# Patient Record
Sex: FEMALE | Race: WHITE | Employment: FULL TIME | URBAN - METROPOLITAN AREA
[De-identification: names, ages, dates, MRNs, and addresses within clinical notes are randomized per-mention and may not be internally consistent; named-entity substitution may affect disease eponyms.]

---

## 2024-08-27 ENCOUNTER — HOSPITAL ENCOUNTER (EMERGENCY)
Age: 34
Discharge: HOME | End: 2024-08-27

## 2024-08-27 ENCOUNTER — APPOINTMENT (OUTPATIENT)
Dept: ULTRASOUND IMAGING | Age: 34
End: 2024-08-27

## 2024-08-27 VITALS
OXYGEN SATURATION: 100 % | TEMPERATURE: 98.2 F | RESPIRATION RATE: 17 BRPM | WEIGHT: 215 LBS | DIASTOLIC BLOOD PRESSURE: 66 MMHG | SYSTOLIC BLOOD PRESSURE: 113 MMHG | HEART RATE: 77 BPM

## 2024-08-27 DIAGNOSIS — O26.891 ABDOMINAL PAIN DURING PREGNANCY, FIRST TRIMESTER: Primary | ICD-10-CM

## 2024-08-27 DIAGNOSIS — Z3A.12 12 WEEKS GESTATION OF PREGNANCY: ICD-10-CM

## 2024-08-27 DIAGNOSIS — R11.2 NAUSEA AND VOMITING, UNSPECIFIED VOMITING TYPE: ICD-10-CM

## 2024-08-27 DIAGNOSIS — K59.00 CONSTIPATION, UNSPECIFIED CONSTIPATION TYPE: ICD-10-CM

## 2024-08-27 DIAGNOSIS — R10.9 ABDOMINAL PAIN DURING PREGNANCY, FIRST TRIMESTER: Primary | ICD-10-CM

## 2024-08-27 DIAGNOSIS — E86.0 DEHYDRATION: ICD-10-CM

## 2024-08-27 DIAGNOSIS — R10.9 ABDOMINAL PAIN, UNSPECIFIED ABDOMINAL LOCATION: ICD-10-CM

## 2024-08-27 LAB
ALBUMIN SERPL-MCNC: 3.9 GM/DL (ref 3.4–5)
ALP BLD-CCNC: 73 IU/L (ref 40–128)
ALT SERPL-CCNC: 13 U/L (ref 10–40)
ANION GAP SERPL CALCULATED.3IONS-SCNC: 12 MMOL/L (ref 7–16)
AST SERPL-CCNC: 25 IU/L (ref 15–37)
BACTERIA: ABNORMAL /HPF
BASOPHILS ABSOLUTE: 0 K/CU MM
BASOPHILS RELATIVE PERCENT: 0.2 % (ref 0–1)
BILIRUB SERPL-MCNC: 0.2 MG/DL (ref 0–1)
BILIRUBIN, URINE: NEGATIVE MG/DL
BLOOD, URINE: NEGATIVE
BUN SERPL-MCNC: 10 MG/DL (ref 6–23)
CALCIUM SERPL-MCNC: 9.1 MG/DL (ref 8.3–10.6)
CHLORIDE BLD-SCNC: 101 MMOL/L (ref 99–110)
CLARITY, UA: ABNORMAL
CO2: 21 MMOL/L (ref 21–32)
COLOR, UA: YELLOW
CREAT SERPL-MCNC: 0.4 MG/DL (ref 0.6–1.1)
DIFFERENTIAL TYPE: ABNORMAL
EOSINOPHILS ABSOLUTE: 0.2 K/CU MM
EOSINOPHILS RELATIVE PERCENT: 2.1 % (ref 0–3)
GFR, ESTIMATED: >90 ML/MIN/1.73M2
GLUCOSE SERPL-MCNC: 83 MG/DL (ref 70–99)
GLUCOSE URINE: NEGATIVE MG/DL
HCT VFR BLD CALC: 39.5 % (ref 37–47)
HEMOGLOBIN: 12.9 GM/DL (ref 12.5–16)
IMMATURE NEUTROPHIL %: 0.2 % (ref 0–0.43)
KETONES, URINE: ABNORMAL MG/DL
LEUKOCYTE ESTERASE, URINE: ABNORMAL
LIPASE: 18 IU/L (ref 13–60)
LYMPHOCYTES ABSOLUTE: 2.4 K/CU MM
LYMPHOCYTES RELATIVE PERCENT: 25.9 % (ref 24–44)
MAGNESIUM: 2 MG/DL (ref 1.8–2.4)
MCH RBC QN AUTO: 29.3 PG (ref 27–31)
MCHC RBC AUTO-ENTMCNC: 32.7 % (ref 32–36)
MCV RBC AUTO: 89.6 FL (ref 78–100)
MONOCYTES ABSOLUTE: 0.5 K/CU MM
MONOCYTES RELATIVE PERCENT: 5.1 % (ref 0–4)
MUCUS: ABNORMAL HPF
NEUTROPHILS ABSOLUTE: 6.1 K/CU MM
NEUTROPHILS RELATIVE PERCENT: 66.5 % (ref 36–66)
NITRITE URINE, QUANTITATIVE: NEGATIVE
NUCLEATED RBC %: 0 %
PDW BLD-RTO: 13.2 % (ref 11.7–14.9)
PH, URINE: 6 (ref 5–8)
PLATELET # BLD: 179 K/CU MM (ref 140–440)
PMV BLD AUTO: 12.5 FL (ref 7.5–11.1)
POTASSIUM SERPL-SCNC: 5 MMOL/L (ref 3.5–5.1)
PROTEIN UA: NEGATIVE MG/DL
RBC # BLD: 4.41 M/CU MM (ref 4.2–5.4)
RBC URINE: 0 /HPF (ref 0–6)
SODIUM BLD-SCNC: 134 MMOL/L (ref 135–145)
SPECIFIC GRAVITY UA: 1.02 (ref 1–1.03)
SQUAMOUS EPITHELIAL: 26 /HPF
TOTAL IMMATURE NEUTOROPHIL: 0.02 K/CU MM
TOTAL NUCLEATED RBC: 0 K/CU MM
TOTAL PROTEIN: 6.8 GM/DL (ref 6.4–8.2)
TRICHOMONAS: ABNORMAL /HPF
UROBILINOGEN, URINE: 2 MG/DL (ref 0.2–1)
WBC # BLD: 9.2 K/CU MM (ref 4–10.5)
WBC UA: 5 /HPF (ref 0–5)

## 2024-08-27 PROCEDURE — 96372 THER/PROPH/DIAG INJ SC/IM: CPT

## 2024-08-27 PROCEDURE — 99284 EMERGENCY DEPT VISIT MOD MDM: CPT

## 2024-08-27 PROCEDURE — 81001 URINALYSIS AUTO W/SCOPE: CPT

## 2024-08-27 PROCEDURE — 80053 COMPREHEN METABOLIC PANEL: CPT

## 2024-08-27 PROCEDURE — 83735 ASSAY OF MAGNESIUM: CPT

## 2024-08-27 PROCEDURE — 93975 VASCULAR STUDY: CPT

## 2024-08-27 PROCEDURE — 83690 ASSAY OF LIPASE: CPT

## 2024-08-27 PROCEDURE — 76801 OB US < 14 WKS SINGLE FETUS: CPT

## 2024-08-27 PROCEDURE — 96374 THER/PROPH/DIAG INJ IV PUSH: CPT

## 2024-08-27 PROCEDURE — 6360000002 HC RX W HCPCS: Performed by: NURSE PRACTITIONER

## 2024-08-27 PROCEDURE — 2580000003 HC RX 258: Performed by: NURSE PRACTITIONER

## 2024-08-27 PROCEDURE — 96361 HYDRATE IV INFUSION ADD-ON: CPT

## 2024-08-27 PROCEDURE — 85025 COMPLETE CBC W/AUTO DIFF WBC: CPT

## 2024-08-27 RX ORDER — DOXYLAMINE SUCCINATE AND PYRIDOXINE HYDROCHLORIDE, DELAYED RELEASE TABLETS 10 MG/10 MG 10; 10 MG/1; MG/1
10 TABLET, DELAYED RELEASE ORAL 2 TIMES DAILY
Qty: 60 TABLET | Refills: 0 | Status: SHIPPED | OUTPATIENT
Start: 2024-08-27 | End: 2024-09-26

## 2024-08-27 RX ORDER — PROMETHAZINE HYDROCHLORIDE 25 MG/1
25 SUPPOSITORY RECTAL EVERY 6 HOURS PRN
Qty: 20 SUPPOSITORY | Refills: 0 | Status: SHIPPED | OUTPATIENT
Start: 2024-08-27 | End: 2024-09-03

## 2024-08-27 RX ORDER — ONDANSETRON 2 MG/ML
4 INJECTION INTRAMUSCULAR; INTRAVENOUS ONCE
Status: COMPLETED | OUTPATIENT
Start: 2024-08-27 | End: 2024-08-27

## 2024-08-27 RX ORDER — 0.9 % SODIUM CHLORIDE 0.9 %
2000 INTRAVENOUS SOLUTION INTRAVENOUS ONCE
Status: COMPLETED | OUTPATIENT
Start: 2024-08-27 | End: 2024-08-27

## 2024-08-27 RX ORDER — PROMETHAZINE HYDROCHLORIDE 25 MG/ML
25 INJECTION, SOLUTION INTRAMUSCULAR; INTRAVENOUS ONCE
Status: COMPLETED | OUTPATIENT
Start: 2024-08-27 | End: 2024-08-27

## 2024-08-27 RX ADMIN — ONDANSETRON 4 MG: 2 INJECTION INTRAMUSCULAR; INTRAVENOUS at 18:04

## 2024-08-27 RX ADMIN — PROMETHAZINE HYDROCHLORIDE 25 MG: 25 INJECTION INTRAMUSCULAR; INTRAVENOUS at 19:11

## 2024-08-27 RX ADMIN — SODIUM CHLORIDE 2000 ML: 9 INJECTION, SOLUTION INTRAVENOUS at 18:05

## 2024-08-27 ASSESSMENT — PAIN - FUNCTIONAL ASSESSMENT
PAIN_FUNCTIONAL_ASSESSMENT: 0-10
PAIN_FUNCTIONAL_ASSESSMENT: 0-10

## 2024-08-27 ASSESSMENT — PAIN SCALES - GENERAL
PAINLEVEL_OUTOF10: 6
PAINLEVEL_OUTOF10: 0

## 2024-08-27 NOTE — ED NOTES
Patient presents to Ed with complaints of constipation and emesis. Patient reports that she is approx 12 weeks pregnant.. patient denies any vaginal bleeding discharge or abdominal pain.

## 2024-08-27 NOTE — ED PROVIDER NOTES
[facility] EMERGENCY DEPARTMENT  EMERGENCY DEPARTMENT ENCOUNTER        Pt Name: Marissa Flores  MRN: [id number]  Birthdate 1990  Date of evaluation: 8/27/2024  Provider: DIANE CULVER - CNP  PCP: No primary care provider on file.    RAÚL. I have evaluated this patient.        Triage CHIEF COMPLAINT       Chief Complaint   Patient presents with    Constipation    Emesis         HISTORY OF PRESENT ILLNESS      Chief Complaint: N/V, abdominal pain, constipation    Marissa Flores is a 34 y.o. female who presents for evaluation of N/V in pregnancy as well as abdominal cramping and constipation.  No BM for 1 week.  The cramping is all in the lower abdomen, she feels full like she needs to have a BM.  She has not taken in much fluid or food over the past week due to vomiting from pregnancy, has not been able to keep down fluids for the last 3 days.  Decreased urine output last few days.  Has been taking ondansetron, last dose yesterday, taking miralax the last 2 days and one dose of colace today.  She is having some mild dysuria.  She is 12weeks pregnant, has had a confirmed IUP by US at [facility].        Nursing Notes were all reviewed and agreed with or any disagreements were addressed in the HPI.    REVIEW OF SYSTEMS     Pertinent ROS as noted in HPI.      PAST MEDICAL HISTORY   No past medical history on file.    SURGICAL HISTORY     Past Surgical History:   Procedure Laterality Date    STOMACH SURGERY         CURRENTMEDICATIONS       Discharge Medication List as of 8/27/2024  9:19 PM          ALLERGIES     Patient has no known allergies.    FAMILYHISTORY     No family history on file.     SOCIAL HISTORY       Social History     Socioeconomic History    Marital status:    Tobacco Use    Smoking status: Never    Smokeless tobacco: Never   Substance and Sexual Activity    Drug use: Not Currently     Social Determinants of Health     Financial Resource

## 2024-09-03 ENCOUNTER — HOSPITAL ENCOUNTER (EMERGENCY)
Age: 34
Discharge: HOME | End: 2024-09-03
Attending: STUDENT IN AN ORGANIZED HEALTH CARE EDUCATION/TRAINING PROGRAM

## 2024-09-03 VITALS
WEIGHT: 212 LBS | BODY MASS INDEX: 35.32 KG/M2 | RESPIRATION RATE: 15 BRPM | SYSTOLIC BLOOD PRESSURE: 120 MMHG | HEIGHT: 65 IN | HEART RATE: 70 BPM | OXYGEN SATURATION: 100 % | TEMPERATURE: 97.8 F | DIASTOLIC BLOOD PRESSURE: 74 MMHG

## 2024-09-03 DIAGNOSIS — K59.00 CONSTIPATION, UNSPECIFIED CONSTIPATION TYPE: Primary | ICD-10-CM

## 2024-09-03 LAB
ALBUMIN SERPL-MCNC: 3.7 GM/DL (ref 3.4–5)
ALP BLD-CCNC: 70 IU/L (ref 40–129)
ALT SERPL-CCNC: 13 U/L (ref 10–40)
ANION GAP SERPL CALCULATED.3IONS-SCNC: 10 MMOL/L (ref 7–16)
AST SERPL-CCNC: 14 IU/L (ref 15–37)
BASOPHILS ABSOLUTE: 0 K/CU MM
BASOPHILS RELATIVE PERCENT: 0.4 % (ref 0–1)
BILIRUB SERPL-MCNC: 0.2 MG/DL (ref 0–1)
BUN SERPL-MCNC: 6 MG/DL (ref 6–23)
CALCIUM SERPL-MCNC: 9.1 MG/DL (ref 8.3–10.6)
CHLORIDE BLD-SCNC: 105 MMOL/L (ref 99–110)
CO2: 23 MMOL/L (ref 21–32)
CREAT SERPL-MCNC: 0.2 MG/DL (ref 0.6–1.1)
DIFFERENTIAL TYPE: ABNORMAL
EOSINOPHILS ABSOLUTE: 0.1 K/CU MM
EOSINOPHILS RELATIVE PERCENT: 1.1 % (ref 0–3)
GFR, ESTIMATED: >90 ML/MIN/1.73M2
GLUCOSE SERPL-MCNC: 83 MG/DL (ref 70–99)
HCT VFR BLD CALC: 39 % (ref 37–47)
HEMOGLOBIN: 12.9 GM/DL (ref 12.5–16)
IMMATURE NEUTROPHIL %: 0.3 % (ref 0–0.43)
LYMPHOCYTES ABSOLUTE: 1.4 K/CU MM
LYMPHOCYTES RELATIVE PERCENT: 17.6 % (ref 24–44)
MAGNESIUM: 1.7 MG/DL (ref 1.8–2.4)
MCH RBC QN AUTO: 29.3 PG (ref 27–31)
MCHC RBC AUTO-ENTMCNC: 33.1 % (ref 32–36)
MCV RBC AUTO: 88.4 FL (ref 78–100)
MONOCYTES ABSOLUTE: 0.3 K/CU MM
MONOCYTES RELATIVE PERCENT: 4.3 % (ref 0–4)
NEUTROPHILS ABSOLUTE: 6.1 K/CU MM
NEUTROPHILS RELATIVE PERCENT: 76.3 % (ref 36–66)
NUCLEATED RBC %: 0 %
PDW BLD-RTO: 13.2 % (ref 11.7–14.9)
PLATELET # BLD: 193 K/CU MM (ref 140–440)
PMV BLD AUTO: 12.5 FL (ref 7.5–11.1)
POTASSIUM SERPL-SCNC: 4.4 MMOL/L (ref 3.5–5.1)
RBC # BLD: 4.41 M/CU MM (ref 4.2–5.4)
SODIUM BLD-SCNC: 138 MMOL/L (ref 135–145)
TOTAL IMMATURE NEUTOROPHIL: 0.02 K/CU MM
TOTAL NUCLEATED RBC: 0 K/CU MM
TOTAL PROTEIN: 6.3 GM/DL (ref 6.4–8.2)
WBC # BLD: 8 K/CU MM (ref 4–10.5)

## 2024-09-03 PROCEDURE — 80053 COMPREHEN METABOLIC PANEL: CPT

## 2024-09-03 PROCEDURE — 99284 EMERGENCY DEPT VISIT MOD MDM: CPT

## 2024-09-03 PROCEDURE — 2580000003 HC RX 258: Performed by: STUDENT IN AN ORGANIZED HEALTH CARE EDUCATION/TRAINING PROGRAM

## 2024-09-03 PROCEDURE — 83735 ASSAY OF MAGNESIUM: CPT

## 2024-09-03 PROCEDURE — 6370000000 HC RX 637 (ALT 250 FOR IP): Performed by: STUDENT IN AN ORGANIZED HEALTH CARE EDUCATION/TRAINING PROGRAM

## 2024-09-03 PROCEDURE — 85025 COMPLETE CBC W/AUTO DIFF WBC: CPT

## 2024-09-03 RX ORDER — DOCUSATE SODIUM 100 MG/1
100 CAPSULE, LIQUID FILLED ORAL ONCE
Status: COMPLETED | OUTPATIENT
Start: 2024-09-03 | End: 2024-09-03

## 2024-09-03 RX ORDER — POLYETHYLENE GLYCOL 3350 17 G/17G
17 POWDER, FOR SOLUTION ORAL DAILY PRN
Qty: 10 EACH | Refills: 0 | Status: SHIPPED | OUTPATIENT
Start: 2024-09-03 | End: 2024-09-13

## 2024-09-03 RX ORDER — POLYETHYLENE GLYCOL 3350 17 G/17G
17 POWDER, FOR SOLUTION ORAL ONCE
Status: COMPLETED | OUTPATIENT
Start: 2024-09-03 | End: 2024-09-03

## 2024-09-03 RX ORDER — 0.9 % SODIUM CHLORIDE 0.9 %
1000 INTRAVENOUS SOLUTION INTRAVENOUS ONCE
Status: COMPLETED | OUTPATIENT
Start: 2024-09-03 | End: 2024-09-03

## 2024-09-03 RX ORDER — CALCIUM POLYCARBOPHIL 625 MG
1 TABLET ORAL
Qty: 30 TABLET | Refills: 0 | Status: SHIPPED | OUTPATIENT
Start: 2024-09-03 | End: 2024-09-13

## 2024-09-03 RX ORDER — DOCUSATE SODIUM 100 MG/1
100 CAPSULE, LIQUID FILLED ORAL 2 TIMES DAILY PRN
Qty: 20 CAPSULE | Refills: 0 | Status: SHIPPED | OUTPATIENT
Start: 2024-09-03 | End: 2024-09-13

## 2024-09-03 RX ADMIN — DOCUSATE SODIUM 100 MG: 100 CAPSULE, LIQUID FILLED ORAL at 14:02

## 2024-09-03 RX ADMIN — POLYETHYLENE GLYCOL (3350) 17 G: 17 POWDER, FOR SOLUTION ORAL at 14:02

## 2024-09-03 RX ADMIN — Medication 1 PACKET: at 14:02

## 2024-09-03 RX ADMIN — SODIUM CHLORIDE 1000 ML: 9 INJECTION, SOLUTION INTRAVENOUS at 13:56

## 2024-09-03 ASSESSMENT — PAIN SCALES - GENERAL
PAINLEVEL_OUTOF10: 7
PAINLEVEL_OUTOF10: 5

## 2024-09-03 ASSESSMENT — PAIN DESCRIPTION - LOCATION
LOCATION: ABDOMEN
LOCATION: RECTUM;ABDOMEN

## 2024-09-03 ASSESSMENT — PAIN DESCRIPTION - PAIN TYPE: TYPE: ACUTE PAIN

## 2024-09-03 ASSESSMENT — PAIN DESCRIPTION - ORIENTATION: ORIENTATION: LOWER;MID

## 2024-09-03 ASSESSMENT — PAIN DESCRIPTION - DESCRIPTORS
DESCRIPTORS: CRAMPING;PRESSURE
DESCRIPTORS: ACHING

## 2024-09-03 NOTE — ED PROVIDER NOTES
Emergency Department Encounter        Pt Name: Marissa Flores  MRN: [id number]  Birthdate 1990  Date of evaluation: 9/3/2024  ED Physician: Remy Nicolas MD    CHIEF COMPLAINT     Triage Chief Complaint:   Constipation (Patient reports for 2 weeks. )      HISTORY OF PRESENT ILLNESS & REVIEW OF SYSTEMS     History obtained from the patient and staff and guest at bedside.    Marissa Flores is a 34 y.o. female who presents to the emergency department for evaluation of constipation.  Says that she is having a lot of issues with constipation lately.  Says that her last real bowel movement was about 2 weeks ago but has been able to have small bowel movements with some jeff.  Says that she is 13 weeks pregnant and has had ultrasound confirmed for her pregnancy.  Says she has a history of \"Hg \"and has been vomiting a lot.  States that she thinks he might be dehydrated.  Denies any dysuria.  Says that she has tried some stool softeners over-the-counter without resolution.        Patient denies any new Headache, Fever, Chills, Cough, Chest pain, Shortness of breath, Abdominal pain, Diarrhea, and Leg swelling.    The patient has no other acute complaints at this time.  Review of systems as above.          PAST MED/SURG/SOCIAL/FAM HISTORY & ALLERGY & MEDICATIONS     Past Medical History:   Diagnosis Date    H/O gastric sleeve     july of 2023     There is no problem list on file for this patient.    No family history on file.  No current facility-administered medications for this encounter.    Current Outpatient Medications:     docusate sodium (COLACE) 100 MG capsule, Take 1 capsule by mouth 2 times daily as needed for Constipation, Disp: 20 capsule, Rfl: 0    polyethylene glycol (MIRALAX) 17 g packet, Take 1 packet by mouth daily as needed for Constipation, Disp: 10 each, Rfl: 0    Calcium Polycarbophil (FIBER) 625 MG TABS, Take 1 tablet by mouth 3 times daily (with meals) for 10 days, Disp: 30 tablet, Rfl: 0    
home with return precautions.  She understands this plan and is agreeable with that.    ED Medication Orders (From admission, onward)      Start Ordered     Status Ordering Provider    09/03/24 1300 09/03/24 1256  sodium chloride 0.9 % bolus 1,000 mL  ONCE         Last MAR action: New Bag - by SCOOBY CASTLE on 09/03/24 at 1356 FELICIANO PEREZ    09/03/24 1300 09/03/24 1256  psyllium (HYDROCIL) 95 % packet 1 packet  ONCE         Last MAR action: Given - by SCOOBY CASTLE on 09/03/24 at 1402 FELICIANO PEREZ    09/03/24 1300 09/03/24 1256  polyethylene glycol (GLYCOLAX) packet 17 g  ONCE         Last MAR action: Given - by SCOOBY CASTLE on 09/03/24 at 1402 FELICIANO PEREZ    09/03/24 1300 09/03/24 1256  docusate sodium (COLACE) capsule 100 mg  ONCE         Last MAR action: Given - by SCOOBY CASTLE on 09/03/24 at 1402 FELICIANO PEREZ            Final Impression      1. Constipation, unspecified constipation type        DISPOSITION Decision To Discharge 09/03/2024 01:47:37 PM  Condition at Disposition: Stable     (Please note that portions of this note may have been completed with a voice recognition program. Efforts were made to edit the dictations but occasionally words are mis-transcribed.)    David Albarado DO   [employer]        David Albarado DO  09/03/24 1730

## 2024-09-03 NOTE — DISCHARGE INSTRUCTIONS
Make sure to eat and drink plenty of fluids to stay well-hydrated.  You can use MiraLAX, Dulcolax, fiber to help with your symptoms.  You can also use over-the-counter enemas or other stool softeners or laxatives.  Please call and follow up with your OBGYN  Call and follow-up with your family doctor in the next 1-3 days  Return to the ED if your symptoms worsen or you feel you need to be reevaluated    You can use the resources below to find a new family doctor if needed:                                                Primary Care Physicians    [facility]    Dr. Yoel Hernandez MD  [facility]  [address]  [place] [address]  [phone]    Melissa Li CNP  [facility]  [address]  [place], [place] [address]  [phone]    [facility]    Mavis Hernandez MD  [facility] [address]  [place], [place] [phone]      [facility].     Navneet Paz MD  [address]  [place], [place] [address]  [phone]    Ifrah Marsh CNP  [address] [place] [address]  [phone]    Stella Dixon CNP   [address] [place] [address]  [phone]    Haley Escalera MD  [address]  [place], [place] [address]  [phone]     Kristina Ibarra MD  [address]  [place], [place] [address]  [phone]    Kristen Potts PA-C  [address]  [place], [place] [address]  [phone]    Primary Care Providers [facility]    Dr. Dieter King MD  [address] [place] [address]  [phone]    Dr. Beck Guevara MD   [address] [place] [address]  [phone]    Primary Care Providers Flako Trejo MD  [address] [place], [place] [address] [phone]       [facility]    Mala Chapman MD  [address] [address]  [phone]    Arturo Galaviz YUAN  [facility]  [address] [place] [address]  [phone]       Internal Med    Stella Trivedi,

## 2024-10-14 ENCOUNTER — HOSPITAL ENCOUNTER (EMERGENCY)
Age: 34
Discharge: HOME | End: 2024-10-15

## 2024-10-14 DIAGNOSIS — R42 LIGHTHEADEDNESS: Primary | ICD-10-CM

## 2024-10-14 LAB
ALBUMIN SERPL-MCNC: 3.6 G/DL (ref 3.4–5)
ALBUMIN/GLOB SERPL: 1.6 {RATIO} (ref 1.1–2.2)
ALP SERPL-CCNC: 66 U/L (ref 40–129)
ALT SERPL-CCNC: 7 U/L (ref 10–40)
ANION GAP SERPL CALCULATED.3IONS-SCNC: 11 MMOL/L (ref 9–17)
AST SERPL-CCNC: 11 U/L (ref 15–37)
B-HCG SERPL EIA 3RD IS-ACNC: NORMAL MIU/ML
BACTERIA URNS QL MICRO: ABNORMAL
BASOPHILS # BLD: 0.04 K/UL
BASOPHILS NFR BLD: 0 % (ref 0–1)
BILIRUB SERPL-MCNC: <0.2 MG/DL (ref 0–1)
BILIRUB UR QL STRIP: NEGATIVE
BUN SERPL-MCNC: 5 MG/DL (ref 7–20)
CALCIUM SERPL-MCNC: 9 MG/DL (ref 8.3–10.6)
CHLORIDE SERPL-SCNC: 105 MMOL/L (ref 99–110)
CLARITY UR: ABNORMAL
CO2 SERPL-SCNC: 22 MMOL/L (ref 21–32)
COLOR UR: YELLOW
CREAT SERPL-MCNC: 0.5 MG/DL (ref 0.6–1.1)
EOSINOPHIL # BLD: 0.17 K/UL
EOSINOPHILS RELATIVE PERCENT: 2 % (ref 0–3)
EPI CELLS #/AREA URNS HPF: 37 /HPF
ERYTHROCYTE [DISTWIDTH] IN BLOOD BY AUTOMATED COUNT: 14.1 % (ref 11.7–14.9)
GFR, ESTIMATED: >90 ML/MIN/1.73M2
GLUCOSE SERPL-MCNC: 72 MG/DL (ref 74–99)
GLUCOSE UR STRIP-MCNC: NEGATIVE MG/DL
HCT VFR BLD AUTO: 35.4 % (ref 37–47)
HGB BLD-MCNC: 11.6 G/DL (ref 12.5–16)
HGB UR QL STRIP.AUTO: NEGATIVE
IMM GRANULOCYTES # BLD AUTO: 0.02 K/UL
IMM GRANULOCYTES NFR BLD: 0 %
KETONES UR STRIP-MCNC: ABNORMAL MG/DL
LEUKOCYTE ESTERASE UR QL STRIP: ABNORMAL
LIPASE SERPL-CCNC: 19 U/L (ref 13–60)
LYMPHOCYTES NFR BLD: 1.96 K/UL
LYMPHOCYTES RELATIVE PERCENT: 21 % (ref 24–44)
MCH RBC QN AUTO: 29.4 PG (ref 27–31)
MCHC RBC AUTO-ENTMCNC: 32.8 G/DL (ref 32–36)
MCV RBC AUTO: 89.6 FL (ref 78–100)
MONOCYTES NFR BLD: 0.44 K/UL
MONOCYTES NFR BLD: 5 % (ref 0–4)
MUCOUS THREADS URNS QL MICRO: ABNORMAL
NEUTROPHILS NFR BLD: 72 % (ref 36–66)
NEUTS SEG NFR BLD: 6.86 K/UL
NITRITE UR QL STRIP: NEGATIVE
PH UR STRIP: 7 [PH] (ref 5–8)
PLATELET # BLD AUTO: 201 K/UL (ref 140–440)
PMV BLD AUTO: 12 FL (ref 7.5–11.1)
POTASSIUM SERPL-SCNC: 3.8 MMOL/L (ref 3.5–5.1)
PROT SERPL-MCNC: 5.8 G/DL (ref 6.4–8.2)
PROT UR STRIP-MCNC: NEGATIVE MG/DL
RBC # BLD AUTO: 3.95 M/UL (ref 4.2–5.4)
RBC #/AREA URNS HPF: 8 /HPF (ref 0–2)
SODIUM SERPL-SCNC: 138 MMOL/L (ref 136–145)
SP GR UR STRIP: 1.02 (ref 1–1.03)
TRICHOMONAS #/AREA URNS HPF: ABNORMAL /[HPF]
TROPONIN I SERPL HS-MCNC: <6 NG/L (ref 0–14)
UROBILINOGEN UR STRIP-ACNC: 1 EU/DL (ref 0–1)
WBC #/AREA URNS HPF: 5 /HPF (ref 0–5)
WBC OTHER # BLD: 9.5 K/UL (ref 4–10.5)

## 2024-10-14 PROCEDURE — 84484 ASSAY OF TROPONIN QUANT: CPT

## 2024-10-14 PROCEDURE — 81001 URINALYSIS AUTO W/SCOPE: CPT

## 2024-10-14 PROCEDURE — 85025 COMPLETE CBC W/AUTO DIFF WBC: CPT

## 2024-10-14 PROCEDURE — 99284 EMERGENCY DEPT VISIT MOD MDM: CPT

## 2024-10-14 PROCEDURE — 93005 ELECTROCARDIOGRAM TRACING: CPT | Performed by: PHYSICIAN ASSISTANT

## 2024-10-14 PROCEDURE — 84702 CHORIONIC GONADOTROPIN TEST: CPT

## 2024-10-14 PROCEDURE — 80053 COMPREHEN METABOLIC PANEL: CPT

## 2024-10-14 PROCEDURE — 83690 ASSAY OF LIPASE: CPT

## 2024-10-14 RX ORDER — 0.9 % SODIUM CHLORIDE 0.9 %
1000 INTRAVENOUS SOLUTION INTRAVENOUS ONCE
Status: DISCONTINUED | OUTPATIENT
Start: 2024-10-14 | End: 2024-10-14

## 2024-10-14 ASSESSMENT — PAIN DESCRIPTION - DESCRIPTORS: DESCRIPTORS: ACHING

## 2024-10-14 ASSESSMENT — PAIN DESCRIPTION - LOCATION: LOCATION: ABDOMEN

## 2024-10-14 ASSESSMENT — PAIN SCALES - GENERAL: PAINLEVEL_OUTOF10: 5

## 2024-10-14 NOTE — ED PROVIDER NOTES
Triage Chief Complaint:   Dizziness (Pt states that walking makes her dizzy. Pt recently had zofran pump placed. 19 weeks pregnant and had hyperemesis gravidarum )    [place]:  Today in the ED I had the pleasure of caring for Marissa Flores who is a  @ 19 weeks 1 day. 34 y.o. female that presents today to the ED for evlauation.     Pt has hxo of hyperemesis with this pregnancy. Pt had a zofran pump placed yesteday. X1 days she has had abdominal bloating, headache, L sided shoulder pain radiating into the neck, and continued nausea.  Pump was placed 0830 yesterday.       Last bowel movement was 3 days ago, pt states she's been battling constipation this entire pregnancy, she has had enemas today. .   Pt is ~15 months gastric sleeve.       Dizziness:    Pt states that since 6 weeks gestation she will get lightheaded when she moves, stands ups or walks.     Pt states that she will get tunnel vission and sometimes the room will spin. None at this time.     ROS:  REVIEW OF SYSTEMS    At least 10 systems reviewed      All other review of systems are negative  See HPI and nursing notes for additional information       Past Medical History:   Diagnosis Date    H/O gastric sleeve     2023     Past Surgical History:   Procedure Laterality Date    STOMACH SURGERY       No family history on file.  Social History     Socioeconomic History    Marital status:      Spouse name: Not on file    Number of children: Not on file    Years of education: Not on file    Highest education level: Not on file   Occupational History    Not on file   Tobacco Use    Smoking status: Never    Smokeless tobacco: Never   Substance and Sexual Activity    Alcohol use: Not on file    Drug use: Not Currently    Sexual activity: Not on file   Other Topics Concern    Not on file   Social History Narrative    Not on file     Social Determinants of Health     Financial Resource Strain: Low Risk  (3/15/2020)    Received from [employer]

## 2024-10-15 VITALS
SYSTOLIC BLOOD PRESSURE: 108 MMHG | RESPIRATION RATE: 16 BRPM | TEMPERATURE: 98 F | DIASTOLIC BLOOD PRESSURE: 64 MMHG | OXYGEN SATURATION: 100 % | HEART RATE: 80 BPM

## 2024-10-15 LAB
EKG ATRIAL RATE: 74 BPM
EKG DIAGNOSIS: NORMAL
EKG P AXIS: 32 DEGREES
EKG P-R INTERVAL: 104 MS
EKG Q-T INTERVAL: 406 MS
EKG QRS DURATION: 96 MS
EKG QTC CALCULATION (BAZETT): 450 MS
EKG R AXIS: 42 DEGREES
EKG T AXIS: 18 DEGREES
EKG VENTRICULAR RATE: 74 BPM

## 2024-10-15 PROCEDURE — 93010 ELECTROCARDIOGRAM REPORT: CPT | Performed by: INTERNAL MEDICINE

## 2024-10-15 ASSESSMENT — PAIN SCALES - GENERAL: PAINLEVEL_OUTOF10: 0

## 2024-10-15 NOTE — ED NOTES
Pt provided water and diet soda. Encouraged PO intake as pt stated she did not have to urinate. Pt aware urine is needed.

## 2024-10-15 NOTE — ED NOTES
Reviewed AVS with PT. Pt verbalized understanding. PT did not express any additional needs at this time. PT ambulatory out of ED in no apparent distress.

## 2024-10-15 NOTE — ED NOTES
Pt up to bathroom.    No respiratory distress. No stridor, Lungs sounds clear with good aeration bilaterally.

## 2024-11-05 ENCOUNTER — OFFICE VISIT (OUTPATIENT)
Age: 34
End: 2024-11-05

## 2024-11-05 VITALS
BODY MASS INDEX: 36.55 KG/M2 | DIASTOLIC BLOOD PRESSURE: 70 MMHG | RESPIRATION RATE: 16 BRPM | OXYGEN SATURATION: 100 % | SYSTOLIC BLOOD PRESSURE: 122 MMHG | WEIGHT: 219.4 LBS | HEIGHT: 65 IN | HEART RATE: 74 BPM

## 2024-11-05 DIAGNOSIS — O21.0 HYPEREMESIS GRAVIDARUM: Primary | ICD-10-CM

## 2024-11-05 DIAGNOSIS — F33.1 MODERATE EPISODE OF RECURRENT MAJOR DEPRESSIVE DISORDER (HCC): ICD-10-CM

## 2024-11-05 RX ORDER — ONDANSETRON 4 MG/1
4 TABLET, ORALLY DISINTEGRATING ORAL 3 TIMES DAILY PRN
Qty: 21 TABLET | Refills: 0 | Status: SHIPPED | OUTPATIENT
Start: 2024-11-05

## 2024-11-05 RX ORDER — ONDANSETRON 4 MG/1
4 TABLET, FILM COATED ORAL EVERY 8 HOURS PRN
COMMUNITY
End: 2024-11-05 | Stop reason: ALTCHOICE

## 2024-11-05 RX ORDER — ONDANSETRON 4 MG/1
4 TABLET, FILM COATED ORAL EVERY 8 HOURS PRN
Status: CANCELLED | OUTPATIENT
Start: 2024-11-05

## 2024-11-05 SDOH — ECONOMIC STABILITY: FOOD INSECURITY: WITHIN THE PAST 12 MONTHS, YOU WORRIED THAT YOUR FOOD WOULD RUN OUT BEFORE YOU GOT MONEY TO BUY MORE.: NEVER TRUE

## 2024-11-05 SDOH — ECONOMIC STABILITY: FOOD INSECURITY: WITHIN THE PAST 12 MONTHS, THE FOOD YOU BOUGHT JUST DIDN'T LAST AND YOU DIDN'T HAVE MONEY TO GET MORE.: NEVER TRUE

## 2024-11-05 SDOH — ECONOMIC STABILITY: INCOME INSECURITY: HOW HARD IS IT FOR YOU TO PAY FOR THE VERY BASICS LIKE FOOD, HOUSING, MEDICAL CARE, AND HEATING?: NOT HARD AT ALL

## 2024-11-05 ASSESSMENT — PATIENT HEALTH QUESTIONNAIRE - PHQ9
SUM OF ALL RESPONSES TO PHQ QUESTIONS 1-9: 15
6. FEELING BAD ABOUT YOURSELF - OR THAT YOU ARE A FAILURE OR HAVE LET YOURSELF OR YOUR FAMILY DOWN: NOT AT ALL
1. LITTLE INTEREST OR PLEASURE IN DOING THINGS: NEARLY EVERY DAY
SUM OF ALL RESPONSES TO PHQ QUESTIONS 1-9: 15
9. THOUGHTS THAT YOU WOULD BE BETTER OFF DEAD, OR OF HURTING YOURSELF: NOT AT ALL
10. IF YOU CHECKED OFF ANY PROBLEMS, HOW DIFFICULT HAVE THESE PROBLEMS MADE IT FOR YOU TO DO YOUR WORK, TAKE CARE OF THINGS AT HOME, OR GET ALONG WITH OTHER PEOPLE: VERY DIFFICULT
SUM OF ALL RESPONSES TO PHQ QUESTIONS 1-9: 15
7. TROUBLE CONCENTRATING ON THINGS, SUCH AS READING THE NEWSPAPER OR WATCHING TELEVISION: NOT AT ALL
SUM OF ALL RESPONSES TO PHQ9 QUESTIONS 1 & 2: 6
8. MOVING OR SPEAKING SO SLOWLY THAT OTHER PEOPLE COULD HAVE NOTICED. OR THE OPPOSITE, BEING SO FIGETY OR RESTLESS THAT YOU HAVE BEEN MOVING AROUND A LOT MORE THAN USUAL: NOT AT ALL
4. FEELING TIRED OR HAVING LITTLE ENERGY: NEARLY EVERY DAY
3. TROUBLE FALLING OR STAYING ASLEEP: NEARLY EVERY DAY
2. FEELING DOWN, DEPRESSED OR HOPELESS: NEARLY EVERY DAY
5. POOR APPETITE OR OVEREATING: NEARLY EVERY DAY
SUM OF ALL RESPONSES TO PHQ QUESTIONS 1-9: 15

## 2024-11-05 NOTE — PATIENT INSTRUCTIONS
We are committed to providing you the best care possible.    If you receive a survey after visiting one of our offices, please take time to share your experience concerning your physician office visit.  These surveys are confidential and no health information about you is shared.    We are eager to improve for you and continue to give you satisfactory care, we are counting on your feedback to help make that happen.            Welcome to [facility]:    Did you know we now have a faster way for you to move through your appointment? For your convenience, we now have digital registration available. When you schedule your next appointment, you will receive a link via your email as well as a text message that will allow you to complete any paperwork digitally before your appointment.

## 2024-11-05 NOTE — PROGRESS NOTES
[facility]  [address]  [facility] [address]  Dept: [phone]  Dept Fax: [phone]  Loc: [phone]      Visit type: New patient    Encounter Start Time: 2:19 PM EST  Encounter End Time: 2:59 PM EST     Reason for Visit: New Patient (To establish care ) and Flu Vaccine (Declined flu vaccine )      Assessment and Plan       1. Hyperemesis gravidarum  Assessment & Plan:  -Uncontrolled  -Zofran  -Potential negative effects on fetus discussed  Orders:  -     ondansetron (ZOFRAN-ODT) 4 MG disintegrating tablet; Take 1 tablet by mouth 3 times daily as needed for Nausea or Vomiting, Disp-21 tablet, R-0Normal (Patient not taking: Reported on 2/12/2025)  2. Moderate episode of recurrent major depressive disorder (HCC)  Assessment & Plan:  -Uncontrolled  -No SI/HI  -No pharmaceutical treatment at this time due to pregnancy  -Will continue to monitor      Assessment & Plan      Patient was involved and agreeable in shared decision making.  Information about different treatment options including side effects discussed with patient.  Any information requested was supplied.    Return in about 3 months (around 2/5/2025) for Well Check.      Subjective       History of Present Illness    Marissa Flores is a 34 y.o. female who is here to establish care.  Current concerns are depression and nausea.  Patient is pregnant.  This would be her seventh pregnancy with 4 prior deliveries.  2 of the pregnancies ended in spontaneous miscarriages.  She has been diagnosed with hyperemesis gravidarum in the past with every single one of her pregnancies.  This time it is slightly better but the nausea and vomiting and causing patient not to be able to eat anything.  She is nibbling on as much food as she can but too much can cause emesis.  Endorses depression without any suicidal homicidal ideation.  Anhedonia lack of motivation increased appetite with weight gain and decreased sleep are all

## 2025-01-03 ENCOUNTER — HOSPITAL ENCOUNTER (OUTPATIENT)
Age: 35
Discharge: HOME | End: 2025-01-03
Attending: OBSTETRICS & GYNECOLOGY | Admitting: OBSTETRICS & GYNECOLOGY
Payer: MEDICAID

## 2025-01-03 VITALS
OXYGEN SATURATION: 99 % | SYSTOLIC BLOOD PRESSURE: 113 MMHG | RESPIRATION RATE: 16 BRPM | HEART RATE: 86 BPM | DIASTOLIC BLOOD PRESSURE: 71 MMHG | TEMPERATURE: 98.1 F

## 2025-01-03 PROBLEM — R10.9 CRAMPING AFFECTING PREGNANCY, ANTEPARTUM: Status: ACTIVE | Noted: 2025-01-03

## 2025-01-03 PROBLEM — O26.899 CRAMPING AFFECTING PREGNANCY, ANTEPARTUM: Status: ACTIVE | Noted: 2025-01-03

## 2025-01-03 PROCEDURE — 6370000000 HC RX 637 (ALT 250 FOR IP): Performed by: NURSE PRACTITIONER

## 2025-01-03 PROCEDURE — 99202 OFFICE O/P NEW SF 15 MIN: CPT

## 2025-01-03 RX ORDER — METOCLOPRAMIDE 10 MG/1
10 TABLET ORAL EVERY 8 HOURS PRN
COMMUNITY
Start: 2024-09-10

## 2025-01-03 RX ORDER — AMOXICILLIN 250 MG
1 CAPSULE ORAL
COMMUNITY
Start: 2023-11-21

## 2025-01-03 RX ORDER — DOXYLAMINE SUCCINATE 25 MG/1
25 TABLET ORAL NIGHTLY
COMMUNITY

## 2025-01-03 RX ORDER — OMEPRAZOLE 40 MG/1
40 CAPSULE, DELAYED RELEASE ORAL DAILY
COMMUNITY
Start: 2024-10-16

## 2025-01-03 RX ORDER — CALCIUM CARBONATE 500 MG/1
1000 TABLET, CHEWABLE ORAL ONCE
Status: COMPLETED | OUTPATIENT
Start: 2025-01-03 | End: 2025-01-03

## 2025-01-03 RX ORDER — DIPHENHYDRAMINE HYDROCHLORIDE 25 MG/1
25 CAPSULE ORAL 3 TIMES DAILY
COMMUNITY

## 2025-01-03 RX ORDER — PROCHLORPERAZINE MALEATE 10 MG
10 TABLET ORAL EVERY 6 HOURS PRN
COMMUNITY
Start: 2024-10-16

## 2025-01-03 RX ADMIN — CALCIUM CARBONATE 1000 MG: 500 TABLET, CHEWABLE ORAL at 18:00

## 2025-01-03 NOTE — PROGRESS NOTES
Department of Obstetrics and Gynecology  Labor and Delivery  TRIAGE NOTE      SUBJECTIVE:  No chief complaint on file.    Pt presents to triage complains of \" sharp movement \", states her last babies all had anterior placentas and she's never felt this type of movement before, so she wanted to come and make sure \" baby is ok\".  Pt seeks care with a home birth midwife and has had no formal prenatal care     OBJECTIVE          CONSTITUTIONAL:  negative  RESPIRATORY:  negative  CARDIOVASCULAR:  negative  GASTROINTESTINAL:  negative  ALLERGIC/IMMUNOLOGIC:  negative  NEUROLOGICAL:  negative  BEHAVIOR/PSYCH:  negative    Fetal heart rate:        Baseline FHR :    140 bpm              Fetal Accelerations:  present        Fetal Decelerations:  absent        Fetal Variability:   moderate    Contraction frequency:  0 minutes    DATA:  Lab Results   Component Value Date    WBC 9.5 10/14/2024    HGB 11.6 (L) 10/14/2024    HCT 35.4 (L) 10/14/2024    MCV 89.6 10/14/2024     10/14/2024       ASSESSMENT:     34 y.o.  year old  at 30w5d  with 3/9/2025, Date entered prior to episode creation    Reactive NST  CAT 1 FHT      PLAN:  Follow up with homebirth midwife ALMA Nieto, DIANE - NATE

## 2025-01-04 NOTE — FLOWSHEET NOTE
Patient arrives ambulatory to the birthing center with complaints of contractions last night and pressure. Patient escorted to TRI04, instructed to change into gown, obtain a urine specimen, and oriented to room and call light.

## 2025-01-04 NOTE — FLOWSHEET NOTE
EFM and TOCO applied.     Patient reports fetal movement, denies loss of fluids, denies vaginal bleeding, and denies contractions at this time. Patient's abdomen palpates soft and non tender.     Patient reports contractions last night but denies any today. Patient states that she is not having any pain but has had some pressure.

## 2025-01-06 ENCOUNTER — TELEPHONE (OUTPATIENT)
Dept: OBGYN | Age: 35
End: 2025-01-06

## 2025-01-06 ENCOUNTER — HOSPITAL ENCOUNTER (INPATIENT)
Age: 35
LOS: 6 days | Discharge: HOME | DRG: 540 | End: 2025-01-14
Attending: EMERGENCY MEDICINE | Admitting: OBSTETRICS & GYNECOLOGY
Payer: MEDICAID

## 2025-01-06 DIAGNOSIS — G89.18 POSTOPERATIVE PAIN: ICD-10-CM

## 2025-01-06 DIAGNOSIS — Z3A.31 31 WEEKS GESTATION OF PREGNANCY: ICD-10-CM

## 2025-01-06 DIAGNOSIS — T80.211A BLOODSTREAM INFECTION ASSOCIATED WITH CENTRAL VENOUS CATHETER, INITIAL ENCOUNTER: ICD-10-CM

## 2025-01-06 DIAGNOSIS — A41.9 SEPSIS, DUE TO UNSPECIFIED ORGANISM, UNSPECIFIED WHETHER ACUTE ORGAN DYSFUNCTION PRESENT (HCC): Primary | ICD-10-CM

## 2025-01-06 PROBLEM — R00.0 TACHYCARDIA: Status: ACTIVE | Noted: 2025-01-06

## 2025-01-06 PROBLEM — R10.9 CRAMPING AFFECTING PREGNANCY, ANTEPARTUM: Status: RESOLVED | Noted: 2025-01-03 | Resolved: 2025-01-06

## 2025-01-06 PROBLEM — O26.899 CRAMPING AFFECTING PREGNANCY, ANTEPARTUM: Status: RESOLVED | Noted: 2025-01-03 | Resolved: 2025-01-06

## 2025-01-06 LAB
ALBUMIN SERPL-MCNC: 3.1 G/DL (ref 3.4–5)
ALBUMIN/GLOB SERPL: 1.1 {RATIO} (ref 1.1–2.2)
ALP SERPL-CCNC: 126 U/L (ref 40–129)
ALT SERPL-CCNC: 16 U/L (ref 10–40)
ANION GAP SERPL CALCULATED.3IONS-SCNC: 11 MMOL/L (ref 9–17)
AST SERPL-CCNC: 43 U/L (ref 15–37)
BACTERIA URNS QL MICRO: ABNORMAL
BASOPHILS # BLD: 0.01 K/UL
BASOPHILS NFR BLD: 0 % (ref 0–1)
BILIRUB SERPL-MCNC: 0.9 MG/DL (ref 0–1)
BILIRUB UR QL STRIP: NEGATIVE
BUN SERPL-MCNC: 3 MG/DL (ref 7–20)
CALCIUM SERPL-MCNC: 8.5 MG/DL (ref 8.3–10.6)
CHARACTER UR: ABNORMAL
CHLORIDE SERPL-SCNC: 106 MMOL/L (ref 99–110)
CLARITY UR: ABNORMAL
CO2 SERPL-SCNC: 18 MMOL/L (ref 21–32)
COLOR UR: YELLOW
CREAT SERPL-MCNC: 0.5 MG/DL (ref 0.6–1.1)
EOSINOPHIL # BLD: 0.01 K/UL
EOSINOPHILS RELATIVE PERCENT: 0 % (ref 0–3)
EPI CELLS #/AREA URNS HPF: 4 /HPF
ERYTHROCYTE [DISTWIDTH] IN BLOOD BY AUTOMATED COUNT: 13.7 % (ref 11.7–14.9)
GFR, ESTIMATED: >90 ML/MIN/1.73M2
GLUCOSE BLD-MCNC: 96 MG/DL (ref 74–99)
GLUCOSE SERPL-MCNC: 91 MG/DL (ref 74–99)
GLUCOSE UR STRIP-MCNC: NEGATIVE MG/DL
HCT VFR BLD AUTO: 29.5 % (ref 37–47)
HGB BLD-MCNC: 9.3 G/DL (ref 12.5–16)
HGB UR QL STRIP.AUTO: NEGATIVE
IMM GRANULOCYTES # BLD AUTO: 0.1 K/UL
IMM GRANULOCYTES NFR BLD: 2 %
INFLUENZA A BY PCR: NOT DETECTED
INFLUENZA B BY PCR: NOT DETECTED
KETONES UR STRIP-MCNC: ABNORMAL MG/DL
LACTATE BLDV-SCNC: 1.4 MMOL/L (ref 0.4–2)
LACTATE BLDV-SCNC: 2.5 MMOL/L (ref 0.4–2)
LEUKOCYTE ESTERASE UR QL STRIP: ABNORMAL
LYMPHOCYTES NFR BLD: 0.17 K/UL
LYMPHOCYTES RELATIVE PERCENT: 3 % (ref 24–44)
MCH RBC QN AUTO: 27.1 PG (ref 27–31)
MCHC RBC AUTO-ENTMCNC: 31.5 G/DL (ref 32–36)
MCV RBC AUTO: 86 FL (ref 78–100)
MONOCYTES NFR BLD: 0.12 K/UL
MONOCYTES NFR BLD: 2 % (ref 0–4)
MUCOUS THREADS URNS QL MICRO: ABNORMAL
NEUTROPHILS NFR BLD: 94 % (ref 36–66)
NEUTS SEG NFR BLD: 6.28 K/UL
NITRITE UR QL STRIP: NEGATIVE
PH UR STRIP: 6.5 [PH] (ref 5–8)
PLATELET # BLD AUTO: 138 K/UL (ref 140–440)
PMV BLD AUTO: 11.1 FL (ref 7.5–11.1)
POTASSIUM SERPL-SCNC: 3.8 MMOL/L (ref 3.5–5.1)
PROCALCITONIN SERPL-MCNC: 1.07 NG/ML
PROT SERPL-MCNC: 6 G/DL (ref 6.4–8.2)
PROT UR STRIP-MCNC: ABNORMAL MG/DL
RBC # BLD AUTO: 3.43 M/UL (ref 4.2–5.4)
RBC #/AREA URNS HPF: <1 /HPF (ref 0–2)
SARS-COV-2 RDRP RESP QL NAA+PROBE: NOT DETECTED
SODIUM SERPL-SCNC: 136 MMOL/L (ref 136–145)
SP GR UR STRIP: 1.01 (ref 1–1.03)
SPECIMEN DESCRIPTION: NORMAL
UROBILINOGEN UR STRIP-ACNC: 1 EU/DL (ref 0–1)
WBC #/AREA URNS HPF: 1 /HPF (ref 0–5)
WBC OTHER # BLD: 6.7 K/UL (ref 4–10.5)

## 2025-01-06 PROCEDURE — 99285 EMERGENCY DEPT VISIT HI MDM: CPT

## 2025-01-06 PROCEDURE — 96360 HYDRATION IV INFUSION INIT: CPT

## 2025-01-06 PROCEDURE — 83605 ASSAY OF LACTIC ACID: CPT

## 2025-01-06 PROCEDURE — 85025 COMPLETE CBC W/AUTO DIFF WBC: CPT

## 2025-01-06 PROCEDURE — 87635 SARS-COV-2 COVID-19 AMP PRB: CPT

## 2025-01-06 PROCEDURE — 0202U NFCT DS 22 TRGT SARS-COV-2: CPT

## 2025-01-06 PROCEDURE — 2580000003 HC RX 258: Performed by: EMERGENCY MEDICINE

## 2025-01-06 PROCEDURE — 6370000000 HC RX 637 (ALT 250 FOR IP): Performed by: STUDENT IN AN ORGANIZED HEALTH CARE EDUCATION/TRAINING PROGRAM

## 2025-01-06 PROCEDURE — 82962 GLUCOSE BLOOD TEST: CPT

## 2025-01-06 PROCEDURE — 87040 BLOOD CULTURE FOR BACTERIA: CPT

## 2025-01-06 PROCEDURE — 81001 URINALYSIS AUTO W/SCOPE: CPT

## 2025-01-06 PROCEDURE — 6370000000 HC RX 637 (ALT 250 FOR IP): Performed by: EMERGENCY MEDICINE

## 2025-01-06 PROCEDURE — 87186 SC STD MICRODIL/AGAR DIL: CPT

## 2025-01-06 PROCEDURE — 6360000002 HC RX W HCPCS: Performed by: EMERGENCY MEDICINE

## 2025-01-06 PROCEDURE — 93005 ELECTROCARDIOGRAM TRACING: CPT | Performed by: EMERGENCY MEDICINE

## 2025-01-06 PROCEDURE — 80053 COMPREHEN METABOLIC PANEL: CPT

## 2025-01-06 PROCEDURE — 84145 PROCALCITONIN (PCT): CPT

## 2025-01-06 PROCEDURE — 2580000003 HC RX 258: Performed by: STUDENT IN AN ORGANIZED HEALTH CARE EDUCATION/TRAINING PROGRAM

## 2025-01-06 PROCEDURE — 86140 C-REACTIVE PROTEIN: CPT

## 2025-01-06 PROCEDURE — 99222 1ST HOSP IP/OBS MODERATE 55: CPT | Performed by: STUDENT IN AN ORGANIZED HEALTH CARE EDUCATION/TRAINING PROGRAM

## 2025-01-06 PROCEDURE — 87502 INFLUENZA DNA AMP PROBE: CPT

## 2025-01-06 PROCEDURE — 6360000002 HC RX W HCPCS: Performed by: STUDENT IN AN ORGANIZED HEALTH CARE EDUCATION/TRAINING PROGRAM

## 2025-01-06 RX ORDER — IBUPROFEN 600 MG/1
600 TABLET, FILM COATED ORAL ONCE
Status: DISCONTINUED | OUTPATIENT
Start: 2025-01-06 | End: 2025-01-06

## 2025-01-06 RX ORDER — 0.9 % SODIUM CHLORIDE 0.9 %
1000 INTRAVENOUS SOLUTION INTRAVENOUS ONCE
Status: DISCONTINUED | OUTPATIENT
Start: 2025-01-06 | End: 2025-01-09

## 2025-01-06 RX ORDER — CALCIUM CARBONATE 500 MG/1
500 TABLET, CHEWABLE ORAL ONCE
Status: COMPLETED | OUTPATIENT
Start: 2025-01-06 | End: 2025-01-06

## 2025-01-06 RX ORDER — ONDANSETRON 4 MG/1
4 TABLET, ORALLY DISINTEGRATING ORAL EVERY 8 HOURS PRN
Status: DISCONTINUED | OUTPATIENT
Start: 2025-01-06 | End: 2025-01-14 | Stop reason: HOSPADM

## 2025-01-06 RX ORDER — ACETAMINOPHEN 325 MG/1
650 TABLET ORAL ONCE
Status: COMPLETED | OUTPATIENT
Start: 2025-01-06 | End: 2025-01-06

## 2025-01-06 RX ORDER — ACETAMINOPHEN 500 MG
1000 TABLET ORAL EVERY 6 HOURS PRN
Status: DISCONTINUED | OUTPATIENT
Start: 2025-01-06 | End: 2025-01-14 | Stop reason: HOSPADM

## 2025-01-06 RX ORDER — 0.9 % SODIUM CHLORIDE 0.9 %
500 INTRAVENOUS SOLUTION INTRAVENOUS ONCE
Status: COMPLETED | OUTPATIENT
Start: 2025-01-06 | End: 2025-01-06

## 2025-01-06 RX ORDER — FAMOTIDINE 10 MG/ML
20 INJECTION, SOLUTION INTRAVENOUS 2 TIMES DAILY PRN
Status: DISCONTINUED | OUTPATIENT
Start: 2025-01-06 | End: 2025-01-14 | Stop reason: HOSPADM

## 2025-01-06 RX ORDER — SODIUM CHLORIDE, SODIUM LACTATE, POTASSIUM CHLORIDE, CALCIUM CHLORIDE 600; 310; 30; 20 MG/100ML; MG/100ML; MG/100ML; MG/100ML
INJECTION, SOLUTION INTRAVENOUS CONTINUOUS
Status: DISCONTINUED | OUTPATIENT
Start: 2025-01-06 | End: 2025-01-14 | Stop reason: HOSPADM

## 2025-01-06 RX ORDER — ONDANSETRON 2 MG/ML
4 INJECTION INTRAMUSCULAR; INTRAVENOUS EVERY 6 HOURS PRN
Status: DISCONTINUED | OUTPATIENT
Start: 2025-01-06 | End: 2025-01-14 | Stop reason: HOSPADM

## 2025-01-06 RX ADMIN — CEFEPIME 2000 MG: 2 INJECTION, POWDER, FOR SOLUTION INTRAVENOUS at 16:54

## 2025-01-06 RX ADMIN — ACETAMINOPHEN 1000 MG: 500 TABLET ORAL at 16:51

## 2025-01-06 RX ADMIN — ACETAMINOPHEN 650 MG: 325 TABLET ORAL at 14:26

## 2025-01-06 RX ADMIN — CALCIUM CARBONATE 500 MG: 500 TABLET, CHEWABLE ORAL at 16:51

## 2025-01-06 RX ADMIN — ONDANSETRON 4 MG: 2 INJECTION INTRAMUSCULAR; INTRAVENOUS at 16:52

## 2025-01-06 RX ADMIN — ACETAMINOPHEN 1000 MG: 500 TABLET ORAL at 22:10

## 2025-01-06 RX ADMIN — SODIUM CHLORIDE 500 ML: 9 INJECTION, SOLUTION INTRAVENOUS at 14:27

## 2025-01-06 RX ADMIN — SODIUM CHLORIDE, POTASSIUM CHLORIDE, SODIUM LACTATE AND CALCIUM CHLORIDE 125 ML/HR: 600; 310; 30; 20 INJECTION, SOLUTION INTRAVENOUS at 20:46

## 2025-01-06 ASSESSMENT — PAIN SCALES - GENERAL: PAINLEVEL_OUTOF10: 5

## 2025-01-06 ASSESSMENT — PAIN DESCRIPTION - LOCATION: LOCATION: HEAD

## 2025-01-06 NOTE — ED NOTES
ED TO INPATIENT SBAR HANDOFF    Patient Name: Marissa Flores   :  1990  34 y.o.   Preferred Name  Marissa  Family/Caregiver Present no   Restraints no   C-SSRS: Risk of Suicide: No Risk  Sitter no   Sepsis Risk Score        Situation  Chief Complaint   Patient presents with    Chills     Chills and shakiness,     Congestion     Nasal and chest congestion     Hypoglycemia     States it was 60 last night at home     Brief Description of Patient's Condition: Patient presenting to the ED with flu like symptoms. Patient is 31 weeks pregnant  Mental Status: oriented, alert, coherent, logical, thought processes intact, and able to concentrate and follow conversation  Arrived from: home    Imaging:   No orders to display     Abnormal labs:   Abnormal Labs Reviewed   CBC WITH AUTO DIFFERENTIAL - Abnormal; Notable for the following components:       Result Value    RBC 3.43 (*)     Hemoglobin 9.3 (*)     Hematocrit 29.5 (*)     MCHC 31.5 (*)     Platelets 138 (*)     Neutrophils % 94 (*)     Lymphocytes % 3 (*)     Immature Granulocytes % 2 (*)     All other components within normal limits   COMPREHENSIVE METABOLIC PANEL - Abnormal; Notable for the following components:    CO2 18 (*)     BUN 3 (*)     Creatinine 0.5 (*)     Total Protein 6.0 (*)     Albumin 3.1 (*)     AST 43 (*)     All other components within normal limits        Background  History:   Past Medical History:   Diagnosis Date    Atypical glandular cells on cervical Pap smear     H/O gastric sleeve     2023       Assessment    Vitals:        Vitals:    25 1419 25 1515 25 1545 25 1620   BP:  114/71 (!) 101/45    Pulse: (!) 142 (!) 121 (!) 123    Resp:  17 24    Temp:    98.9 °F (37.2 °C)   TempSrc:    Oral   SpO2:       Weight:       Height:           PO Status: Nothing by Mouth    O2 Flow Rate:        Cardiac Rhythm: NSR    Last documented pain medication administered:     NIH Score: NIH       Active LDA's:   Peripheral IV

## 2025-01-06 NOTE — ED PROVIDER NOTES
ox interpretation is - normal    General appearance:  No acute distress.   Skin:  Warm. Dry.  PICC line site at left upper arm is clean and dry, no swelling, no erythema, no drainage  Eye:  Extraocular movements intact.     Ears, nose, mouth and throat:  Oral mucosa moist   Neck:  Trachea midline.   Extremity:  No swelling.  Normal ROM     Heart:  tachycardic with regular rhythm, rate 120s on my evaluation. normal S1 & S2, no extra heart sounds.    Perfusion:  intact  Respiratory:  Lungs clear to auscultation bilaterally.  Respirations nonlabored.     Abdominal:  Gravid. Soft.  Nontender.  Non distended.  Neurological:  Alert and oriented            Psychiatric:  Appropriate    I have reviewed and interpreted all of the currently available lab results from this visit (if applicable):  Results for orders placed or performed during the hospital encounter of 01/06/25   Influenza A and B    Specimen: Nasal   Result Value Ref Range    Influenza A by PCR Not Detected Not Detected    Influenza B by PCR Not Detected Not Detected   COVID-19, Rapid    Specimen: Nasopharyngeal Swab   Result Value Ref Range    Specimen Description .NASOPHARYNGEAL SWAB     SARS-CoV-2, Rapid Not Detected Not Detected   CBC with Auto Differential   Result Value Ref Range    WBC 6.7 4.0 - 10.5 k/uL    RBC 3.43 (L) 4.20 - 5.40 m/uL    Hemoglobin 9.3 (L) 12.5 - 16.0 g/dL    Hematocrit 29.5 (L) 37.0 - 47.0 %    MCV 86.0 78.0 - 100.0 fL    MCH 27.1 27.0 - 31.0 pg    MCHC 31.5 (L) 32.0 - 36.0 g/dL    RDW 13.7 11.7 - 14.9 %    Platelets 138 (L) 140 - 440 k/uL    MPV 11.1 7.5 - 11.1 fL    Neutrophils % 94 (H) 36 - 66 %    Lymphocytes % 3 (L) 24 - 44 %    Monocytes % 2 0 - 4 %    Eosinophils % 0 0 - 3 %    Basophils % 0 0 - 1 %    Immature Granulocytes % 2 (H) 0 %    Neutrophils Absolute 6.28 k/uL    Lymphocytes Absolute 0.17 k/uL    Monocytes Absolute 0.12 k/uL    Eosinophils Absolute 0.01 k/uL    Basophils Absolute 0.01 k/uL    Immature Granulocytes

## 2025-01-06 NOTE — H&P
Department of Obstetrics and Gynecology  Labor and Delivery  History and Physical   Name:  Marissa Flores   CSN: [id number]   Attending Provider: Priti Emerson DO  Location:  3129/3129-A   : 1990   Age: 34 y.o.    REASON FOR ADMISSION: Sepsis of unknown origin    SUBJECTIVE:  The patient is a 34 y.o.  at 31w1d weeks who presented to the ED with fever, shaking and congestion. Reports chills and shaking yesterday. Notes she has had low BP at home and temp of 100.4. Says her blood sugar was normal. Says that this morning she hooked her IV up and got through half her LR when she started having shaking and chills. Receives prenatal care at [facility], but reports they stopped taking her insurance recently and she has a home birth midwife. Has PICC line in place that home midwife administers medication to her twice daily that she reports is IV LR. Notes hyperemesis in this pregnancy. Hx  x4. Has had her US in this pregnancy and notes they were normal.       ROS:  General: Denies fevers, chills, excessive fatigue  CV: Denies chest pain, shortness of breath, palpitations  Resp: Denies shortness of breath, cough, pain upon inspiration  GI: Denies abnormal bowel movements, change in stool color, hematochezia  : Denies dysuria, hematuria, foul odor  HEENT: Denies vision changes, difficulty breathing, sore throat  Neuro: Denies dizziness, weakness or tingling in unilateral or bilateral extremities  MSK: Denies unilateral swelling, back pain, restricted mobility  Skin: Denies rashes, erythema, or excessive bruising  Psych: Denies severe mood swings, changes in appetite or sleep habits    Pregnancy complications:  Present on Admission:   Sepsis (HCC)   31 weeks gestation of pregnancy   Tachycardia      OBJECTIVE    Vitals:  BP (!) 101/55   Pulse (!) 114   Temp 98.5 °F (36.9 °C) (Oral)   Resp 26   Ht 1.651 m (5' 5\")   Wt 99.8 kg (220 lb)   SpO2 100%   BMI 36.61 kg/m²     Constitutional:  Alert and

## 2025-01-07 ENCOUNTER — APPOINTMENT (OUTPATIENT)
Dept: GENERAL RADIOLOGY | Age: 35
DRG: 540 | End: 2025-01-07
Payer: MEDICAID

## 2025-01-07 LAB
B PARAP IS1001 DNA NPH QL NAA+NON-PROBE: NOT DETECTED
B PERT DNA SPEC QL NAA+PROBE: NOT DETECTED
C PNEUM DNA NPH QL NAA+NON-PROBE: NOT DETECTED
CRP SERPL HS-MCNC: 49 MG/L (ref 0–5)
FLUAV RNA NPH QL NAA+NON-PROBE: NOT DETECTED
FLUBV RNA NPH QL NAA+NON-PROBE: NOT DETECTED
HADV DNA NPH QL NAA+NON-PROBE: NOT DETECTED
HCOV 229E RNA NPH QL NAA+NON-PROBE: NOT DETECTED
HCOV HKU1 RNA NPH QL NAA+NON-PROBE: DETECTED
HCOV NL63 RNA NPH QL NAA+NON-PROBE: NOT DETECTED
HCOV OC43 RNA NPH QL NAA+NON-PROBE: NOT DETECTED
HMPV RNA NPH QL NAA+NON-PROBE: NOT DETECTED
HPIV1 RNA NPH QL NAA+NON-PROBE: NOT DETECTED
HPIV2 RNA NPH QL NAA+NON-PROBE: NOT DETECTED
HPIV3 RNA NPH QL NAA+NON-PROBE: NOT DETECTED
HPIV4 RNA NPH QL NAA+NON-PROBE: NOT DETECTED
LACTATE BLDV-SCNC: 1.4 MMOL/L (ref 0.4–2)
LACTATE BLDV-SCNC: 1.8 MMOL/L (ref 0.4–2)
M PNEUMO DNA NPH QL NAA+NON-PROBE: NOT DETECTED
PROCALCITONIN SERPL-MCNC: 1.84 NG/ML
RSV RNA NPH QL NAA+NON-PROBE: NOT DETECTED
RV+EV RNA NPH QL NAA+NON-PROBE: NOT DETECTED
SARS-COV-2 RNA NPH QL NAA+NON-PROBE: NOT DETECTED
SPECIMEN DESCRIPTION: ABNORMAL

## 2025-01-07 PROCEDURE — 6370000000 HC RX 637 (ALT 250 FOR IP): Performed by: STUDENT IN AN ORGANIZED HEALTH CARE EDUCATION/TRAINING PROGRAM

## 2025-01-07 PROCEDURE — 59025 FETAL NON-STRESS TEST: CPT

## 2025-01-07 PROCEDURE — 99255 IP/OBS CONSLTJ NEW/EST HI 80: CPT | Performed by: INTERNAL MEDICINE

## 2025-01-07 PROCEDURE — 87040 BLOOD CULTURE FOR BACTERIA: CPT

## 2025-01-07 PROCEDURE — 6360000002 HC RX W HCPCS: Performed by: STUDENT IN AN ORGANIZED HEALTH CARE EDUCATION/TRAINING PROGRAM

## 2025-01-07 PROCEDURE — 71045 X-RAY EXAM CHEST 1 VIEW: CPT

## 2025-01-07 PROCEDURE — 2580000003 HC RX 258: Performed by: STUDENT IN AN ORGANIZED HEALTH CARE EDUCATION/TRAINING PROGRAM

## 2025-01-07 PROCEDURE — 2500000003 HC RX 250 WO HCPCS: Performed by: STUDENT IN AN ORGANIZED HEALTH CARE EDUCATION/TRAINING PROGRAM

## 2025-01-07 PROCEDURE — 36415 COLL VENOUS BLD VENIPUNCTURE: CPT

## 2025-01-07 PROCEDURE — 84145 PROCALCITONIN (PCT): CPT

## 2025-01-07 PROCEDURE — 83605 ASSAY OF LACTIC ACID: CPT

## 2025-01-07 PROCEDURE — APPSS60 APP SPLIT SHARED TIME 46-60 MINUTES: Performed by: NURSE PRACTITIONER

## 2025-01-07 PROCEDURE — 94761 N-INVAS EAR/PLS OXIMETRY MLT: CPT

## 2025-01-07 PROCEDURE — 6370000000 HC RX 637 (ALT 250 FOR IP): Performed by: NURSE PRACTITIONER

## 2025-01-07 RX ORDER — OXYCODONE HYDROCHLORIDE 5 MG/1
5 TABLET ORAL ONCE
Status: COMPLETED | OUTPATIENT
Start: 2025-01-07 | End: 2025-01-07

## 2025-01-07 RX ORDER — OXYCODONE HYDROCHLORIDE 5 MG/1
5 TABLET ORAL EVERY 6 HOURS PRN
Status: DISCONTINUED | OUTPATIENT
Start: 2025-01-07 | End: 2025-01-09

## 2025-01-07 RX ADMIN — OXYCODONE HYDROCHLORIDE 5 MG: 5 TABLET ORAL at 20:02

## 2025-01-07 RX ADMIN — OXYCODONE HYDROCHLORIDE 5 MG: 5 TABLET ORAL at 03:20

## 2025-01-07 RX ADMIN — CEFEPIME 2000 MG: 2 INJECTION, POWDER, FOR SOLUTION INTRAVENOUS at 08:48

## 2025-01-07 RX ADMIN — FAMOTIDINE 20 MG: 10 INJECTION, SOLUTION INTRAVENOUS at 06:36

## 2025-01-07 RX ADMIN — ONDANSETRON 4 MG: 2 INJECTION INTRAMUSCULAR; INTRAVENOUS at 10:11

## 2025-01-07 RX ADMIN — OXYCODONE HYDROCHLORIDE 5 MG: 5 TABLET ORAL at 11:43

## 2025-01-07 RX ADMIN — FAMOTIDINE 20 MG: 10 INJECTION, SOLUTION INTRAVENOUS at 14:43

## 2025-01-07 RX ADMIN — CEFEPIME 2000 MG: 2 INJECTION, POWDER, FOR SOLUTION INTRAVENOUS at 17:03

## 2025-01-07 RX ADMIN — SODIUM CHLORIDE, POTASSIUM CHLORIDE, SODIUM LACTATE AND CALCIUM CHLORIDE 125 ML/HR: 600; 310; 30; 20 INJECTION, SOLUTION INTRAVENOUS at 06:40

## 2025-01-07 RX ADMIN — CEFEPIME 2000 MG: 2 INJECTION, POWDER, FOR SOLUTION INTRAVENOUS at 01:04

## 2025-01-07 RX ADMIN — ACETAMINOPHEN 1000 MG: 500 TABLET ORAL at 08:51

## 2025-01-07 RX ADMIN — ACETAMINOPHEN 1000 MG: 500 TABLET ORAL at 17:01

## 2025-01-07 ASSESSMENT — PAIN - FUNCTIONAL ASSESSMENT
PAIN_FUNCTIONAL_ASSESSMENT: PREVENTS OR INTERFERES SOME ACTIVE ACTIVITIES AND ADLS
PAIN_FUNCTIONAL_ASSESSMENT: PREVENTS OR INTERFERES SOME ACTIVE ACTIVITIES AND ADLS
PAIN_FUNCTIONAL_ASSESSMENT: ACTIVITIES ARE NOT PREVENTED
PAIN_FUNCTIONAL_ASSESSMENT: PREVENTS OR INTERFERES SOME ACTIVE ACTIVITIES AND ADLS

## 2025-01-07 ASSESSMENT — PAIN DESCRIPTION - LOCATION
LOCATION: GENERALIZED
LOCATION: ABDOMEN;BACK
LOCATION: GENERALIZED
LOCATION: GENERALIZED

## 2025-01-07 ASSESSMENT — PAIN SCALES - GENERAL
PAINLEVEL_OUTOF10: 2
PAINLEVEL_OUTOF10: 10
PAINLEVEL_OUTOF10: 0
PAINLEVEL_OUTOF10: 5
PAINLEVEL_OUTOF10: 2
PAINLEVEL_OUTOF10: 3
PAINLEVEL_OUTOF10: 10
PAINLEVEL_OUTOF10: 4
PAINLEVEL_OUTOF10: 10

## 2025-01-07 ASSESSMENT — PAIN DESCRIPTION - DESCRIPTORS
DESCRIPTORS: ACHING;DISCOMFORT
DESCRIPTORS: ACHING
DESCRIPTORS: ACHING;DISCOMFORT
DESCRIPTORS: ACHING;DISCOMFORT

## 2025-01-07 ASSESSMENT — PAIN DESCRIPTION - ONSET
ONSET: ON-GOING
ONSET: ON-GOING

## 2025-01-07 ASSESSMENT — PAIN DESCRIPTION - PAIN TYPE
TYPE: ACUTE PAIN
TYPE: ACUTE PAIN

## 2025-01-07 ASSESSMENT — PAIN DESCRIPTION - ORIENTATION
ORIENTATION: UPPER;LOWER
ORIENTATION: OTHER (COMMENT)

## 2025-01-07 ASSESSMENT — PAIN DESCRIPTION - DIRECTION
RADIATING_TOWARDS: GENERALIZED
RADIATING_TOWARDS: ALL OVER

## 2025-01-07 ASSESSMENT — PAIN DESCRIPTION - FREQUENCY
FREQUENCY: CONTINUOUS
FREQUENCY: CONTINUOUS

## 2025-01-07 ASSESSMENT — PAIN SCALES - WONG BAKER: WONGBAKER_NUMERICALRESPONSE: NO HURT

## 2025-01-07 NOTE — CARE COORDINATION
Met with pt at bedside to initiate discharge planning. Introduced self and role of CM. Pt is from home with spouse. Has insurance. Denies any needs at discharge. Pt does have PICC line and is active with [facility] who provides pt with IV infusions a couple of times a week. Plan is home with home care. Will need new home health order at discharge.      01/07/25 1436   Service Assessment   Patient Orientation Alert and Oriented   Cognition Alert   History Provided By Patient;Medical Record   Primary Caregiver Self   Accompanied By/Relationship N/A   Support Systems Spouse/Significant Other   Patient's Healthcare Decision Maker is: Legal Next of Kin   PCP Verified by CM Yes  (Pt states Dr. Lee in [place])   Last Visit to PCP   (Unknown)   Prior Functional Level Independent in ADLs/IADLs   Current Functional Level Independent in ADLs/IADLs   Can patient return to prior living arrangement Yes   Ability to make needs known: Good   Family able to assist with home care needs: Yes   Would you like for me to discuss the discharge plan with any other family members/significant others, and if so, who? No   Financial Resources Medicaid   Community Resources ECF/Home Care   CM/SW Referral Other (see comment)  (Discharge planning assessment)

## 2025-01-08 ENCOUNTER — ANESTHESIA EVENT (OUTPATIENT)
Dept: LABOR AND DELIVERY | Age: 35
End: 2025-01-08
Payer: MEDICAID

## 2025-01-08 ENCOUNTER — ANESTHESIA (OUTPATIENT)
Dept: LABOR AND DELIVERY | Age: 35
End: 2025-01-08
Payer: MEDICAID

## 2025-01-08 PROBLEM — T80.211A BLOODSTREAM INFECTION DUE TO CENTRAL VENOUS CATHETER: Status: ACTIVE | Noted: 2025-01-08

## 2025-01-08 PROBLEM — A49.8 INFECTION DUE TO ACINETOBACTER BAUMANNII: Status: ACTIVE | Noted: 2025-01-08

## 2025-01-08 LAB
ALBUMIN SERPL-MCNC: 2.5 G/DL (ref 3.4–5)
ALBUMIN SERPL-MCNC: 2.5 G/DL (ref 3.4–5)
ALBUMIN/GLOB SERPL: 1.1 {RATIO} (ref 1.1–2.2)
ALBUMIN/GLOB SERPL: 1.3 {RATIO} (ref 1.1–2.2)
ALP SERPL-CCNC: 111 U/L (ref 40–129)
ALP SERPL-CCNC: 140 U/L (ref 40–129)
ALT SERPL-CCNC: 19 U/L (ref 10–40)
ALT SERPL-CCNC: 21 U/L (ref 10–40)
ANION GAP SERPL CALCULATED.3IONS-SCNC: 13 MMOL/L (ref 9–17)
ANION GAP SERPL CALCULATED.3IONS-SCNC: 9 MMOL/L (ref 9–17)
AST SERPL-CCNC: 45 U/L (ref 15–37)
AST SERPL-CCNC: 49 U/L (ref 15–37)
BASOPHILS # BLD: 0 K/UL
BASOPHILS NFR BLD: 0 % (ref 0–1)
BILIRUB SERPL-MCNC: 0.8 MG/DL (ref 0–1)
BILIRUB SERPL-MCNC: 1 MG/DL (ref 0–1)
BNP SERPL-MCNC: 2162 PG/ML (ref 0–125)
BUN SERPL-MCNC: 4 MG/DL (ref 7–20)
BUN SERPL-MCNC: 5 MG/DL (ref 7–20)
CALCIUM SERPL-MCNC: 8.1 MG/DL (ref 8.3–10.6)
CALCIUM SERPL-MCNC: 8.1 MG/DL (ref 8.3–10.6)
CHLORIDE SERPL-SCNC: 108 MMOL/L (ref 99–110)
CHLORIDE SERPL-SCNC: 108 MMOL/L (ref 99–110)
CO2 SERPL-SCNC: 16 MMOL/L (ref 21–32)
CO2 SERPL-SCNC: 19 MMOL/L (ref 21–32)
CREAT SERPL-MCNC: 0.4 MG/DL (ref 0.6–1.1)
CREAT SERPL-MCNC: 0.5 MG/DL (ref 0.6–1.1)
CRP SERPL HS-MCNC: 105 MG/L (ref 0–5)
CRP SERPL HS-MCNC: 99.2 MG/L (ref 0–5)
EOSINOPHIL # BLD: 0 K/UL
EOSINOPHILS RELATIVE PERCENT: 0 % (ref 0–3)
ERYTHROCYTE [DISTWIDTH] IN BLOOD BY AUTOMATED COUNT: 14.2 % (ref 11.7–14.9)
ERYTHROCYTE [DISTWIDTH] IN BLOOD BY AUTOMATED COUNT: 14.5 % (ref 11.7–14.9)
FIBRINOGEN PPP-MCNC: 376 MG/DL (ref 170–540)
GFR, ESTIMATED: >90 ML/MIN/1.73M2
GFR, ESTIMATED: >90 ML/MIN/1.73M2
GLUCOSE SERPL-MCNC: 83 MG/DL (ref 74–99)
GLUCOSE SERPL-MCNC: 92 MG/DL (ref 74–99)
HCT VFR BLD AUTO: 22.9 % (ref 37–47)
HCT VFR BLD AUTO: 25.3 % (ref 37–47)
HGB BLD-MCNC: 7.2 G/DL (ref 12.5–16)
HGB BLD-MCNC: 7.7 G/DL (ref 12.5–16)
INR PPP: 1.1
LACTATE BLDV-SCNC: 2.8 MMOL/L (ref 0.4–2)
LACTATE BLDV-SCNC: 3.9 MMOL/L (ref 0.4–2)
LYMPHOCYTES NFR BLD: 0.1 K/UL
LYMPHOCYTES RELATIVE PERCENT: 3 % (ref 24–44)
MCH RBC QN AUTO: 26.8 PG (ref 27–31)
MCH RBC QN AUTO: 27.4 PG (ref 27–31)
MCHC RBC AUTO-ENTMCNC: 30.4 G/DL (ref 32–36)
MCHC RBC AUTO-ENTMCNC: 31.4 G/DL (ref 32–36)
MCV RBC AUTO: 87.1 FL (ref 78–100)
MCV RBC AUTO: 88.2 FL (ref 78–100)
MONOCYTES NFR BLD: 0.1 K/UL
MONOCYTES NFR BLD: 3 % (ref 0–4)
NEUTROPHILS NFR BLD: 94 % (ref 36–66)
NEUTS SEG NFR BLD: 3.01 K/UL
PARTIAL THROMBOPLASTIN TIME: 35.3 SEC (ref 25.1–37.1)
PLATELET CONFIRMATION: NORMAL
PLATELET, FLUORESCENCE: 74 K/UL (ref 140–440)
PLATELET, FLUORESCENCE: 87 K/UL (ref 140–440)
PMV BLD AUTO: 12.5 FL (ref 7.5–11.1)
PMV BLD AUTO: 12.7 FL (ref 7.5–11.1)
POTASSIUM SERPL-SCNC: 3.3 MMOL/L (ref 3.5–5.1)
POTASSIUM SERPL-SCNC: 3.7 MMOL/L (ref 3.5–5.1)
PROCALCITONIN SERPL-MCNC: 2.09 NG/ML
PROCALCITONIN SERPL-MCNC: 2.34 NG/ML
PROT SERPL-MCNC: 4.4 G/DL (ref 6.4–8.2)
PROT SERPL-MCNC: 4.9 G/DL (ref 6.4–8.2)
PROTHROMBIN TIME: 14.3 SEC (ref 11.7–14.5)
RBC # BLD AUTO: 2.63 M/UL (ref 4.2–5.4)
RBC # BLD AUTO: 2.87 M/UL (ref 4.2–5.4)
RBC # BLD: NORMAL 10*6/UL
SODIUM SERPL-SCNC: 136 MMOL/L (ref 136–145)
SODIUM SERPL-SCNC: 137 MMOL/L (ref 136–145)
TROPONIN I SERPL HS-MCNC: 37 NG/L (ref 0–14)
WBC # BLD: NORMAL 10*3/UL
WBC OTHER # BLD: 3.2 K/UL (ref 4–10.5)
WBC OTHER # BLD: 4.6 K/UL (ref 4–10.5)

## 2025-01-08 PROCEDURE — 3700000001 HC ADD 15 MINUTES (ANESTHESIA): Performed by: OBSTETRICS & GYNECOLOGY

## 2025-01-08 PROCEDURE — 87071 CULTURE AEROBIC QUANT OTHER: CPT

## 2025-01-08 PROCEDURE — 2580000003 HC RX 258: Performed by: INTERNAL MEDICINE

## 2025-01-08 PROCEDURE — 99232 SBSQ HOSP IP/OBS MODERATE 35: CPT | Performed by: NURSE PRACTITIONER

## 2025-01-08 PROCEDURE — 87186 SC STD MICRODIL/AGAR DIL: CPT

## 2025-01-08 PROCEDURE — 6360000002 HC RX W HCPCS: Performed by: INTERNAL MEDICINE

## 2025-01-08 PROCEDURE — 2709999900 HC NON-CHARGEABLE SUPPLY: Performed by: OBSTETRICS & GYNECOLOGY

## 2025-01-08 PROCEDURE — 6370000000 HC RX 637 (ALT 250 FOR IP): Performed by: SPECIALIST

## 2025-01-08 PROCEDURE — 2580000003 HC RX 258: Performed by: STUDENT IN AN ORGANIZED HEALTH CARE EDUCATION/TRAINING PROGRAM

## 2025-01-08 PROCEDURE — 86140 C-REACTIVE PROTEIN: CPT

## 2025-01-08 PROCEDURE — 6360000002 HC RX W HCPCS: Performed by: STUDENT IN AN ORGANIZED HEALTH CARE EDUCATION/TRAINING PROGRAM

## 2025-01-08 PROCEDURE — 59514 CESAREAN DELIVERY ONLY: CPT | Performed by: OBSTETRICS & GYNECOLOGY

## 2025-01-08 PROCEDURE — 87077 CULTURE AEROBIC IDENTIFY: CPT

## 2025-01-08 PROCEDURE — 3609079900 HC CESAREAN SECTION: Performed by: OBSTETRICS & GYNECOLOGY

## 2025-01-08 PROCEDURE — 86850 RBC ANTIBODY SCREEN: CPT

## 2025-01-08 PROCEDURE — 6370000000 HC RX 637 (ALT 250 FOR IP): Performed by: NURSE PRACTITIONER

## 2025-01-08 PROCEDURE — 6370000000 HC RX 637 (ALT 250 FOR IP): Performed by: STUDENT IN AN ORGANIZED HEALTH CARE EDUCATION/TRAINING PROGRAM

## 2025-01-08 PROCEDURE — 83880 ASSAY OF NATRIURETIC PEPTIDE: CPT

## 2025-01-08 PROCEDURE — 84145 PROCALCITONIN (PCT): CPT

## 2025-01-08 PROCEDURE — 86901 BLOOD TYPING SEROLOGIC RH(D): CPT

## 2025-01-08 PROCEDURE — 3700000000 HC ANESTHESIA ATTENDED CARE: Performed by: OBSTETRICS & GYNECOLOGY

## 2025-01-08 PROCEDURE — 85610 PROTHROMBIN TIME: CPT

## 2025-01-08 PROCEDURE — 86900 BLOOD TYPING SEROLOGIC ABO: CPT

## 2025-01-08 PROCEDURE — 86920 COMPATIBILITY TEST SPIN: CPT

## 2025-01-08 PROCEDURE — 2580000003 HC RX 258: Performed by: NURSE PRACTITIONER

## 2025-01-08 PROCEDURE — 80053 COMPREHEN METABOLIC PANEL: CPT

## 2025-01-08 PROCEDURE — 6370000000 HC RX 637 (ALT 250 FOR IP)

## 2025-01-08 PROCEDURE — 93005 ELECTROCARDIOGRAM TRACING: CPT | Performed by: SPECIALIST

## 2025-01-08 PROCEDURE — 2580000003 HC RX 258: Performed by: SPECIALIST

## 2025-01-08 PROCEDURE — 85730 THROMBOPLASTIN TIME PARTIAL: CPT

## 2025-01-08 PROCEDURE — 2000000000 HC ICU R&B

## 2025-01-08 PROCEDURE — 85384 FIBRINOGEN ACTIVITY: CPT

## 2025-01-08 PROCEDURE — 6360000002 HC RX W HCPCS: Performed by: SPECIALIST

## 2025-01-08 PROCEDURE — 6360000002 HC RX W HCPCS: Performed by: NURSE PRACTITIONER

## 2025-01-08 PROCEDURE — 85025 COMPLETE CBC W/AUTO DIFF WBC: CPT

## 2025-01-08 PROCEDURE — 85027 COMPLETE CBC AUTOMATED: CPT

## 2025-01-08 PROCEDURE — 36415 COLL VENOUS BLD VENIPUNCTURE: CPT

## 2025-01-08 PROCEDURE — 6360000002 HC RX W HCPCS: Performed by: NURSE ANESTHETIST, CERTIFIED REGISTERED

## 2025-01-08 PROCEDURE — 84484 ASSAY OF TROPONIN QUANT: CPT

## 2025-01-08 PROCEDURE — 2720000010 HC SURG SUPPLY STERILE: Performed by: OBSTETRICS & GYNECOLOGY

## 2025-01-08 PROCEDURE — 83605 ASSAY OF LACTIC ACID: CPT

## 2025-01-08 PROCEDURE — 7100000000 HC PACU RECOVERY - FIRST 15 MIN: Performed by: OBSTETRICS & GYNECOLOGY

## 2025-01-08 PROCEDURE — 88307 TISSUE EXAM BY PATHOLOGIST: CPT

## 2025-01-08 RX ORDER — MORPHINE SULFATE 2 MG/ML
2 INJECTION, SOLUTION INTRAMUSCULAR; INTRAVENOUS ONCE
Status: COMPLETED | OUTPATIENT
Start: 2025-01-08 | End: 2025-01-08

## 2025-01-08 RX ORDER — MINOCYCLINE HYDROCHLORIDE 100 MG/1
200 CAPSULE ORAL EVERY 12 HOURS SCHEDULED
Status: DISCONTINUED | OUTPATIENT
Start: 2025-01-08 | End: 2025-01-09

## 2025-01-08 RX ORDER — POTASSIUM CHLORIDE 1500 MG/1
40 TABLET, EXTENDED RELEASE ORAL ONCE
Status: DISCONTINUED | OUTPATIENT
Start: 2025-01-08 | End: 2025-01-09

## 2025-01-08 RX ORDER — MIDAZOLAM HYDROCHLORIDE 1 MG/ML
INJECTION, SOLUTION INTRAMUSCULAR; INTRAVENOUS
Status: DISCONTINUED | OUTPATIENT
Start: 2025-01-08 | End: 2025-01-08 | Stop reason: SDUPTHER

## 2025-01-08 RX ORDER — SODIUM CHLORIDE, SODIUM LACTATE, POTASSIUM CHLORIDE, AND CALCIUM CHLORIDE .6; .31; .03; .02 G/100ML; G/100ML; G/100ML; G/100ML
1000 INJECTION, SOLUTION INTRAVENOUS ONCE
Status: COMPLETED | OUTPATIENT
Start: 2025-01-08 | End: 2025-01-08

## 2025-01-08 RX ORDER — SODIUM CHLORIDE, SODIUM LACTATE, POTASSIUM CHLORIDE, AND CALCIUM CHLORIDE .6; .31; .03; .02 G/100ML; G/100ML; G/100ML; G/100ML
1000 INJECTION, SOLUTION INTRAVENOUS ONCE
Status: COMPLETED | OUTPATIENT
Start: 2025-01-08 | End: 2025-01-09

## 2025-01-08 RX ORDER — MINOCYCLINE HYDROCHLORIDE 100 MG/1
100 CAPSULE ORAL EVERY 12 HOURS SCHEDULED
Status: DISCONTINUED | OUTPATIENT
Start: 2025-01-08 | End: 2025-01-08

## 2025-01-08 RX ORDER — KETOROLAC TROMETHAMINE 30 MG/ML
INJECTION, SOLUTION INTRAMUSCULAR; INTRAVENOUS
Status: DISCONTINUED | OUTPATIENT
Start: 2025-01-08 | End: 2025-01-08 | Stop reason: SDUPTHER

## 2025-01-08 RX ORDER — PROPOFOL 10 MG/ML
INJECTION, EMULSION INTRAVENOUS
Status: DISCONTINUED | OUTPATIENT
Start: 2025-01-08 | End: 2025-01-08 | Stop reason: SDUPTHER

## 2025-01-08 RX ORDER — SUCCINYLCHOLINE/SOD CL,ISO/PF 100 MG/5ML
SYRINGE (ML) INTRAVENOUS
Status: DISCONTINUED | OUTPATIENT
Start: 2025-01-08 | End: 2025-01-08 | Stop reason: SDUPTHER

## 2025-01-08 RX ORDER — ATROPINE SULFATE 1 MG/ML
INJECTION, SOLUTION INTRAMUSCULAR; INTRAVENOUS; SUBCUTANEOUS
Status: DISCONTINUED | OUTPATIENT
Start: 2025-01-08 | End: 2025-01-08 | Stop reason: SDUPTHER

## 2025-01-08 RX ADMIN — Medication 100 MG: at 12:05

## 2025-01-08 RX ADMIN — AMPICILLIN SODIUM AND SULBACTAM SODIUM 6000 MG: 1; .5 INJECTION, POWDER, FOR SOLUTION INTRAVENOUS at 18:53

## 2025-01-08 RX ADMIN — ACETAMINOPHEN 1000 MG: 500 TABLET ORAL at 06:18

## 2025-01-08 RX ADMIN — SODIUM CHLORIDE 3000 MG: 900 INJECTION INTRAVENOUS at 21:50

## 2025-01-08 RX ADMIN — MIDAZOLAM 2 MG: 1 INJECTION INTRAMUSCULAR; INTRAVENOUS at 12:10

## 2025-01-08 RX ADMIN — PHENYLEPHRINE HYDROCHLORIDE 200 MCG: 10 INJECTION INTRAVENOUS at 12:20

## 2025-01-08 RX ADMIN — ONDANSETRON 4 MG: 2 INJECTION INTRAMUSCULAR; INTRAVENOUS at 08:46

## 2025-01-08 RX ADMIN — SODIUM CHLORIDE 3000 MG: 900 INJECTION INTRAVENOUS at 16:41

## 2025-01-08 RX ADMIN — PROPOFOL 200 MG: 10 INJECTION, EMULSION INTRAVENOUS at 12:05

## 2025-01-08 RX ADMIN — MINOCYCLINE HYDROCHLORIDE 200 MG: 100 CAPSULE ORAL at 20:51

## 2025-01-08 RX ADMIN — ONDANSETRON 4 MG: 2 INJECTION INTRAMUSCULAR; INTRAVENOUS at 18:11

## 2025-01-08 RX ADMIN — HYDROMORPHONE HYDROCHLORIDE 0.5 MG: 1 INJECTION, SOLUTION INTRAMUSCULAR; INTRAVENOUS; SUBCUTANEOUS at 12:10

## 2025-01-08 RX ADMIN — Medication 909 ML/HR: at 12:08

## 2025-01-08 RX ADMIN — OXYCODONE HYDROCHLORIDE 5 MG: 5 TABLET ORAL at 06:15

## 2025-01-08 RX ADMIN — OXYCODONE HYDROCHLORIDE 5 MG: 5 TABLET ORAL at 21:08

## 2025-01-08 RX ADMIN — SODIUM CHLORIDE, POTASSIUM CHLORIDE, SODIUM LACTATE AND CALCIUM CHLORIDE: 600; 310; 30; 20 INJECTION, SOLUTION INTRAVENOUS at 11:55

## 2025-01-08 RX ADMIN — Medication 87.3 ML/HR: at 12:19

## 2025-01-08 RX ADMIN — VANCOMYCIN HYDROCHLORIDE 2500 MG: 5 INJECTION, POWDER, LYOPHILIZED, FOR SOLUTION INTRAVENOUS at 13:48

## 2025-01-08 RX ADMIN — CEFEPIME 2000 MG: 2 INJECTION, POWDER, FOR SOLUTION INTRAVENOUS at 16:42

## 2025-01-08 RX ADMIN — KETOROLAC TROMETHAMINE 30 MG: 30 INJECTION, SOLUTION INTRAMUSCULAR; INTRAVENOUS at 11:57

## 2025-01-08 RX ADMIN — ONDANSETRON 8 MG: 2 INJECTION INTRAMUSCULAR; INTRAVENOUS at 11:57

## 2025-01-08 RX ADMIN — ATROPINE SULFATE 1 MG: 1 INJECTION, SOLUTION INTRAMUSCULAR; INTRAVENOUS; SUBCUTANEOUS at 11:57

## 2025-01-08 RX ADMIN — PHENYLEPHRINE HYDROCHLORIDE 200 MCG: 10 INJECTION INTRAVENOUS at 12:25

## 2025-01-08 RX ADMIN — SODIUM CHLORIDE, POTASSIUM CHLORIDE, SODIUM LACTATE AND CALCIUM CHLORIDE 1000 ML: 600; 310; 30; 20 INJECTION, SOLUTION INTRAVENOUS at 23:39

## 2025-01-08 RX ADMIN — SODIUM CHLORIDE, POTASSIUM CHLORIDE, SODIUM LACTATE AND CALCIUM CHLORIDE: 600; 310; 30; 20 INJECTION, SOLUTION INTRAVENOUS at 15:28

## 2025-01-08 RX ADMIN — SODIUM CHLORIDE, POTASSIUM CHLORIDE, SODIUM LACTATE AND CALCIUM CHLORIDE: 600; 310; 30; 20 INJECTION, SOLUTION INTRAVENOUS at 13:21

## 2025-01-08 RX ADMIN — CEFEPIME 2000 MG: 2 INJECTION, POWDER, FOR SOLUTION INTRAVENOUS at 00:29

## 2025-01-08 RX ADMIN — SODIUM CHLORIDE, POTASSIUM CHLORIDE, SODIUM LACTATE AND CALCIUM CHLORIDE 1000 ML: 600; 310; 30; 20 INJECTION, SOLUTION INTRAVENOUS at 18:09

## 2025-01-08 RX ADMIN — PROPOFOL 100 MG: 10 INJECTION, EMULSION INTRAVENOUS at 12:11

## 2025-01-08 RX ADMIN — PROPOFOL 100 MG: 10 INJECTION, EMULSION INTRAVENOUS at 12:28

## 2025-01-08 RX ADMIN — PHENYLEPHRINE HYDROCHLORIDE 200 MCG: 10 INJECTION INTRAVENOUS at 12:10

## 2025-01-08 RX ADMIN — SODIUM CHLORIDE, POTASSIUM CHLORIDE, SODIUM LACTATE AND CALCIUM CHLORIDE: 600; 310; 30; 20 INJECTION, SOLUTION INTRAVENOUS at 21:47

## 2025-01-08 RX ADMIN — PROPOFOL 100 MG: 10 INJECTION, EMULSION INTRAVENOUS at 12:20

## 2025-01-08 RX ADMIN — MORPHINE SULFATE 2 MG: 2 INJECTION, SOLUTION INTRAMUSCULAR; INTRAVENOUS at 15:23

## 2025-01-08 RX ADMIN — OXYCODONE HYDROCHLORIDE 5 MG: 5 TABLET ORAL at 14:56

## 2025-01-08 RX ADMIN — CEFEPIME 2000 MG: 2 INJECTION, POWDER, FOR SOLUTION INTRAVENOUS at 08:51

## 2025-01-08 ASSESSMENT — PAIN DESCRIPTION - LOCATION
LOCATION: CHEST;BACK
LOCATION: ABDOMEN;BACK;CHEST
LOCATION: ABDOMEN
LOCATION: ABDOMEN

## 2025-01-08 ASSESSMENT — PAIN DESCRIPTION - ONSET
ONSET: ON-GOING
ONSET: ON-GOING

## 2025-01-08 ASSESSMENT — PAIN DESCRIPTION - FREQUENCY
FREQUENCY: CONTINUOUS
FREQUENCY: CONTINUOUS

## 2025-01-08 ASSESSMENT — PAIN DESCRIPTION - ORIENTATION
ORIENTATION: MID;LOWER
ORIENTATION: LOWER;MID
ORIENTATION: RIGHT;LEFT;MID;LOWER
ORIENTATION: LOWER

## 2025-01-08 ASSESSMENT — PAIN DESCRIPTION - DESCRIPTORS
DESCRIPTORS: ACHING;DISCOMFORT
DESCRIPTORS: ACHING

## 2025-01-08 ASSESSMENT — PAIN - FUNCTIONAL ASSESSMENT
PAIN_FUNCTIONAL_ASSESSMENT: PREVENTS OR INTERFERES SOME ACTIVE ACTIVITIES AND ADLS
PAIN_FUNCTIONAL_ASSESSMENT: ACTIVITIES ARE NOT PREVENTED
PAIN_FUNCTIONAL_ASSESSMENT: PREVENTS OR INTERFERES SOME ACTIVE ACTIVITIES AND ADLS
PAIN_FUNCTIONAL_ASSESSMENT: PREVENTS OR INTERFERES SOME ACTIVE ACTIVITIES AND ADLS

## 2025-01-08 ASSESSMENT — PAIN DESCRIPTION - DIRECTION: RADIATING_TOWARDS: GENERALIZED

## 2025-01-08 ASSESSMENT — PAIN DESCRIPTION - PAIN TYPE
TYPE: SURGICAL PAIN
TYPE: ACUTE PAIN

## 2025-01-08 ASSESSMENT — PAIN SCALES - GENERAL
PAINLEVEL_OUTOF10: 5
PAINLEVEL_OUTOF10: 10
PAINLEVEL_OUTOF10: 4
PAINLEVEL_OUTOF10: 7
PAINLEVEL_OUTOF10: 3

## 2025-01-08 NOTE — ANESTHESIA PRE PROCEDURE
Department of Anesthesiology  Preprocedure Note       Name:  Marissa Flores   Age:  34 y.o.  :  1990                                          MRN:  [id number]         Date:  2025      Surgeon: Surgeon(s):  Minh Banerjee MD Rockwern, Jennifer, APRN - CNM    Procedure: Procedure(s):   SECTION    Medications prior to admission:   Prior to Admission medications    Medication Sig Start Date End Date Taking? Authorizing Provider   senna-docusate (PERICOLACE) 8.6-50 MG per tablet Take 1 tablet by mouth 23   Hilario Moser MD   prochlorperazine (COMPAZINE) 10 MG tablet Take 1 tablet by mouth every 6 hours as needed 10/16/24   Hilario Moser MD   vitamin B-6 (PYRIDOXINE) 25 MG tablet Take 1 tablet by mouth 3 times daily    Hilario Moser MD   omeprazole (PRILOSEC) 40 MG delayed release capsule Take 1 capsule by mouth daily 10/16/24   Hilario Moser MD   metoclopramide (REGLAN) 10 MG tablet Take 1 tablet by mouth every 8 hours as needed 9/10/24   Hilario Moser MD   UNISOM SLEEPTABS 25 MG tablet Take 1 tablet by mouth nightly at bedtime.    ProviderHilario MD   ondansetron (ZOFRAN-ODT) 4 MG disintegrating tablet Take 1 tablet by mouth 3 times daily as needed for Nausea or Vomiting 24   Robin Lee MD       Current medications:    Current Facility-Administered Medications   Medication Dose Route Frequency Provider Last Rate Last Admin   • potassium chloride (KLOR-CON M) extended release tablet 40 mEq  40 mEq Oral Once Sasha Monae MD       • vancomycin (VANCOCIN) 2,500 mg in sodium chloride 0.9 % 500 mL IVPB  25 mg/kg IntraVENous Once Sasha Monae .7 mL/hr at 25 1348 2,500 mg at 25 1348   • [START ON 2025] vancomycin (VANCOCIN) 1,500 mg in sodium chloride 0.9 % 250 mL IVPB ([id number])  1,500 mg IntraVENous Q12H Sasha Monae MD       • oxyCODONE (ROXICODONE) immediate release tablet 5 mg  5 mg Oral Q6H PRN

## 2025-01-08 NOTE — SIGNIFICANT EVENT
RRT called on patient for sudden onset pain and cyanosis.    Patient was doing well during the morning and she suddenly called for help in pain located all over her body. Did not feel like it was contraction pain. Patient was cold and shivering and in fetal position. She was on non-rebreather saturating 100%, blood pressure was stable.  However on exam patient's skin was purpleish, mild mottling was present on lower extremities.  Patient looked uncomfortable.  Lungs were clear to auscultation.  She was tachycardic to 160s.    The midwife and L&D nurses came to bedside as well and noticed that there was fetal decelerations present on monitor, and decision was made to take patient to the OR for emergency .    CBC, CMP, troponin, lactic acid, CRP, BMP and procalcitonin ordered    I did go to the OR with the patient, initially it was thought that patient was having amniotic fluid embolism, patient was given medications for amniotic fluid embolism.    Patient underwent emergency  with Dr. Banerjee.    After  was:Patient was extubated saturating 100% on 2 L.  Patient was found to have a temperature of 102 rapid response was called again for further management.  Patient was evaluated and was found to be doing better.  Heart rate was between 140s and 160s.  Antibiotics were broadened to vancomycin and cefepime once again.  PICC was removed and catheter was sent for culturing.  Patient was ultimately transferred to the ICU for further management and closer monitoring.    Due to the immediate potential for life-threatening deterioration due to cyanosis, tachycardia, fetal D cells, I spent 65 minutes providing critical care.  This time is excluding time spent performing procedures.

## 2025-01-08 NOTE — FLOWSHEET NOTE
RN responded to rapid response due to known pregnant patient. Upon arrival, patient noted to be blue, reporting that she was unable to breathe, cold, and having pain all over. Pt was tachycardic in the 160s and O2 saturation was noted to be WNLs. EFM applied. FHTs initially obtained at 110-115. Prolonged decelerations noted down to 70s. Cathy, CNM notified and en route to bedside.     1145 - Decision made to take patient to L&D OR for STAT c/s due to FHTs.    Patient wheeled via bed to OR1

## 2025-01-08 NOTE — LACTATION NOTE
Mother states she has breast fed her other children and her plan is to breast feed this child. This child is at [facility]. Mother delivered at 31+3 weeks for primary  section.     Hospital electric breast pump taken to mother and set up. Showed her how to use. Encouraged her to pump every 3 hours for 10 to 15 minutes when she is able to do so. Also informed her if she is unable to pump that often and for that amount of time, any pumping is helpful to keep breasts stimulated for milk production.     Discussed with mother about breast milk storage and to be sure that any breast feeding parts that come in contact with breast milk should be washed with warm soapy water and rinsed well. Reminded mother NOT to put tubing in water.     Lanolin ointment given to mother and explained how to use for sore nipples.     Electric breast pump prescription given.

## 2025-01-08 NOTE — FLOWSHEET NOTE
EFM and TOCO applied for patient NST.  Patient reports decreased fetal movement after a recent episode of tachycardia. Patient denies ant vaginal bleeding but voices concerns of vaginal discharge today and unsure if her water is broken. POC reviewed with patient. Patient voices understanding and agreement.

## 2025-01-08 NOTE — FLOWSHEET NOTE
Amnisure obtained. 1055 Amnisure results negative.   2300 EFM and TOCO removed.  Reactive NST obtained. Patient denies any further needs or questions at this time.

## 2025-01-09 ENCOUNTER — APPOINTMENT (OUTPATIENT)
Dept: NON INVASIVE DIAGNOSTICS | Age: 35
DRG: 540 | End: 2025-01-09
Attending: INTERNAL MEDICINE
Payer: MEDICAID

## 2025-01-09 PROBLEM — O36.8390 ABNORMAL FETAL HEART RATE AFFECTING PREGNANCY: Status: ACTIVE | Noted: 2025-01-09

## 2025-01-09 LAB
ABSOLUTE BANDS: 0.34 K/UL
ANION GAP SERPL CALCULATED.3IONS-SCNC: 7 MMOL/L (ref 9–17)
BANDS: 4 %
BASOPHILS # BLD: 0 K/UL
BASOPHILS NFR BLD: 0 % (ref 0–1)
BUN SERPL-MCNC: 7 MG/DL (ref 7–20)
CALCIUM SERPL-MCNC: 8.1 MG/DL (ref 8.3–10.6)
CHLORIDE SERPL-SCNC: 108 MMOL/L (ref 99–110)
CO2 SERPL-SCNC: 23 MMOL/L (ref 21–32)
CREAT SERPL-MCNC: 0.4 MG/DL (ref 0.6–1.1)
CRP SERPL HS-MCNC: 124 MG/L (ref 0–5)
ECHO AO ROOT DIAM: 2.7 CM
ECHO AO ROOT INDEX: 1.31 CM/M2
ECHO AV AREA PEAK VELOCITY: 3.3 CM2
ECHO AV AREA VTI: 3.5 CM2
ECHO AV AREA/BSA PEAK VELOCITY: 1.6 CM2/M2
ECHO AV AREA/BSA VTI: 1.7 CM2/M2
ECHO AV MEAN GRADIENT: 2 MMHG
ECHO AV MEAN VELOCITY: 0.7 M/S
ECHO AV PEAK GRADIENT: 4 MMHG
ECHO AV PEAK VELOCITY: 1 M/S
ECHO AV VELOCITY RATIO: 0.9
ECHO AV VTI: 18.3 CM
ECHO BSA: 2.14 M2
ECHO EST RA PRESSURE: 15 MMHG
ECHO IVC PROX: 2.7 CM
ECHO LA DIAMETER INDEX: 1.8 CM/M2
ECHO LA DIAMETER: 3.7 CM
ECHO LA TO AORTIC ROOT RATIO: 1.37
ECHO LV E' LATERAL VELOCITY: 15.2 CM/S
ECHO LV E' SEPTAL VELOCITY: 10.4 CM/S
ECHO LV EDV A4C: 119 ML
ECHO LV EDV INDEX A4C: 58 ML/M2
ECHO LV EF PHYSICIAN: 45 %
ECHO LV EJECTION FRACTION A4C: 42 %
ECHO LV ESV A4C: 69 ML
ECHO LV ESV INDEX A4C: 33 ML/M2
ECHO LV FRACTIONAL SHORTENING: 20 % (ref 28–44)
ECHO LV INTERNAL DIMENSION DIASTOLE INDEX: 2.38 CM/M2
ECHO LV INTERNAL DIMENSION DIASTOLIC: 4.9 CM (ref 3.9–5.3)
ECHO LV INTERNAL DIMENSION SYSTOLIC INDEX: 1.89 CM/M2
ECHO LV INTERNAL DIMENSION SYSTOLIC: 3.9 CM
ECHO LV IVSD: 0.7 CM (ref 0.6–0.9)
ECHO LV MASS 2D: 141.9 G (ref 67–162)
ECHO LV MASS INDEX 2D: 68.9 G/M2 (ref 43–95)
ECHO LV POSTERIOR WALL DIASTOLIC: 1 CM (ref 0.6–0.9)
ECHO LV RELATIVE WALL THICKNESS RATIO: 0.41
ECHO LVOT AREA: 3.5 CM2
ECHO LVOT AV VTI INDEX: 1.02
ECHO LVOT DIAM: 2.1 CM
ECHO LVOT MEAN GRADIENT: 2 MMHG
ECHO LVOT PEAK GRADIENT: 3 MMHG
ECHO LVOT PEAK VELOCITY: 0.9 M/S
ECHO LVOT STROKE VOLUME INDEX: 31.4 ML/M2
ECHO LVOT SV: 64.7 ML
ECHO LVOT VTI: 18.7 CM
ECHO MV A VELOCITY: 0.58 M/S
ECHO MV E DECELERATION TIME (DT): 194 MS
ECHO MV E VELOCITY: 0.91 M/S
ECHO MV E/A RATIO: 1.57
ECHO MV E/E' LATERAL: 5.99
ECHO MV E/E' RATIO (AVERAGED): 7.37
ECHO MV E/E' SEPTAL: 8.75
ECHO RIGHT VENTRICULAR SYSTOLIC PRESSURE (RVSP): 45 MMHG
ECHO RV MID DIMENSION: 2.7 CM
ECHO TV REGURGITANT MAX VELOCITY: 2.76 M/S
ECHO TV REGURGITANT PEAK GRADIENT: 30 MMHG
EOSINOPHIL # BLD: 0.08 K/UL
EOSINOPHILS RELATIVE PERCENT: 1 % (ref 0–3)
ERYTHROCYTE [DISTWIDTH] IN BLOOD BY AUTOMATED COUNT: 14 % (ref 11.7–14.9)
GENT RANDOM DATE LAST DOSE: ABNORMAL
GENT RANDOM DOSE AMOUNT: ABNORMAL
GENTAMICIN SERPL-MCNC: 11.5 UG/ML (ref 5–10)
GFR, ESTIMATED: >90 ML/MIN/1.73M2
GLUCOSE SERPL-MCNC: 74 MG/DL (ref 74–99)
HCT VFR BLD AUTO: 17.2 % (ref 37–47)
HCT VFR BLD AUTO: 19.7 % (ref 37–47)
HGB BLD-MCNC: 5.5 G/DL (ref 12.5–16)
HGB BLD-MCNC: 6.4 G/DL (ref 12.5–16)
LACTATE BLDV-SCNC: 1.2 MMOL/L (ref 0.4–2)
LACTATE BLDV-SCNC: 2.1 MMOL/L (ref 0.4–2)
LYMPHOCYTES NFR BLD: 0.67 K/UL
LYMPHOCYTES RELATIVE PERCENT: 8 % (ref 24–44)
MCH RBC QN AUTO: 27.4 PG (ref 27–31)
MCHC RBC AUTO-ENTMCNC: 32 G/DL (ref 32–36)
MCV RBC AUTO: 85.6 FL (ref 78–100)
MICROORGANISM SPEC CULT: ABNORMAL
MICROORGANISM SPEC CULT: ABNORMAL
MICROORGANISM/AGENT SPEC: ABNORMAL
MONOCYTES NFR BLD: 0 % (ref 0–4)
MONOCYTES NFR BLD: 0 K/UL
NEUTROPHILS NFR BLD: 87 % (ref 36–66)
NEUTS SEG NFR BLD: 7.31 K/UL
PLATELET CONFIRMATION: NORMAL
PLATELET ESTIMATE: NORMAL
PLATELET, FLUORESCENCE: 73 K/UL (ref 140–440)
PMV BLD AUTO: 13.3 FL (ref 7.5–11.1)
POTASSIUM SERPL-SCNC: 4.2 MMOL/L (ref 3.5–5.1)
PROCALCITONIN SERPL-MCNC: 6.68 NG/ML
RBC # BLD AUTO: 2.01 M/UL (ref 4.2–5.4)
RBC # BLD: ABNORMAL 10*6/UL
SERVICE CMNT-IMP: ABNORMAL
SODIUM SERPL-SCNC: 137 MMOL/L (ref 136–145)
SPECIMEN DESCRIPTION: ABNORMAL
SPECIMEN DESCRIPTION: ABNORMAL
TME LAST DOSE: ABNORMAL H
WBC # BLD: NORMAL 10*3/UL
WBC OTHER # BLD: 8.4 K/UL (ref 4–10.5)

## 2025-01-09 PROCEDURE — 2000000000 HC ICU R&B

## 2025-01-09 PROCEDURE — 84145 PROCALCITONIN (PCT): CPT

## 2025-01-09 PROCEDURE — 85025 COMPLETE CBC W/AUTO DIFF WBC: CPT

## 2025-01-09 PROCEDURE — P9016 RBC LEUKOCYTES REDUCED: HCPCS

## 2025-01-09 PROCEDURE — 6360000002 HC RX W HCPCS: Performed by: INTERNAL MEDICINE

## 2025-01-09 PROCEDURE — 2580000003 HC RX 258: Performed by: INTERNAL MEDICINE

## 2025-01-09 PROCEDURE — 2580000003 HC RX 258: Performed by: SPECIALIST

## 2025-01-09 PROCEDURE — 94150 VITAL CAPACITY TEST: CPT

## 2025-01-09 PROCEDURE — 80048 BASIC METABOLIC PNL TOTAL CA: CPT

## 2025-01-09 PROCEDURE — 99232 SBSQ HOSP IP/OBS MODERATE 35: CPT | Performed by: NURSE PRACTITIONER

## 2025-01-09 PROCEDURE — 93306 TTE W/DOPPLER COMPLETE: CPT

## 2025-01-09 PROCEDURE — 6360000002 HC RX W HCPCS: Performed by: OBSTETRICS & GYNECOLOGY

## 2025-01-09 PROCEDURE — 86140 C-REACTIVE PROTEIN: CPT

## 2025-01-09 PROCEDURE — 93306 TTE W/DOPPLER COMPLETE: CPT | Performed by: INTERNAL MEDICINE

## 2025-01-09 PROCEDURE — 6360000002 HC RX W HCPCS: Performed by: SPECIALIST

## 2025-01-09 PROCEDURE — 80170 ASSAY OF GENTAMICIN: CPT

## 2025-01-09 PROCEDURE — 6370000000 HC RX 637 (ALT 250 FOR IP): Performed by: STUDENT IN AN ORGANIZED HEALTH CARE EDUCATION/TRAINING PROGRAM

## 2025-01-09 PROCEDURE — 99232 SBSQ HOSP IP/OBS MODERATE 35: CPT | Performed by: INTERNAL MEDICINE

## 2025-01-09 PROCEDURE — 6370000000 HC RX 637 (ALT 250 FOR IP): Performed by: NURSE PRACTITIONER

## 2025-01-09 PROCEDURE — 6370000000 HC RX 637 (ALT 250 FOR IP): Performed by: OBSTETRICS & GYNECOLOGY

## 2025-01-09 PROCEDURE — 94761 N-INVAS EAR/PLS OXIMETRY MLT: CPT

## 2025-01-09 PROCEDURE — 36430 TRANSFUSION BLD/BLD COMPNT: CPT

## 2025-01-09 PROCEDURE — 2580000003 HC RX 258: Performed by: STUDENT IN AN ORGANIZED HEALTH CARE EDUCATION/TRAINING PROGRAM

## 2025-01-09 PROCEDURE — 6370000000 HC RX 637 (ALT 250 FOR IP): Performed by: SPECIALIST

## 2025-01-09 PROCEDURE — 83605 ASSAY OF LACTIC ACID: CPT

## 2025-01-09 PROCEDURE — 85018 HEMOGLOBIN: CPT

## 2025-01-09 PROCEDURE — 85014 HEMATOCRIT: CPT

## 2025-01-09 RX ORDER — SODIUM CHLORIDE 9 MG/ML
INJECTION, SOLUTION INTRAVENOUS PRN
Status: DISCONTINUED | OUTPATIENT
Start: 2025-01-09 | End: 2025-01-14 | Stop reason: HOSPADM

## 2025-01-09 RX ORDER — KETOROLAC TROMETHAMINE 30 MG/ML
30 INJECTION, SOLUTION INTRAMUSCULAR; INTRAVENOUS EVERY 6 HOURS
Status: DISCONTINUED | OUTPATIENT
Start: 2025-01-09 | End: 2025-01-14 | Stop reason: HOSPADM

## 2025-01-09 RX ORDER — OXYCODONE AND ACETAMINOPHEN 5; 325 MG/1; MG/1
2 TABLET ORAL EVERY 4 HOURS PRN
Status: DISCONTINUED | OUTPATIENT
Start: 2025-01-09 | End: 2025-01-14 | Stop reason: HOSPADM

## 2025-01-09 RX ORDER — OXYCODONE AND ACETAMINOPHEN 5; 325 MG/1; MG/1
1 TABLET ORAL EVERY 4 HOURS PRN
Status: DISCONTINUED | OUTPATIENT
Start: 2025-01-09 | End: 2025-01-14 | Stop reason: HOSPADM

## 2025-01-09 RX ORDER — SODIUM CHLORIDE, SODIUM LACTATE, POTASSIUM CHLORIDE, AND CALCIUM CHLORIDE .6; .31; .03; .02 G/100ML; G/100ML; G/100ML; G/100ML
1000 INJECTION, SOLUTION INTRAVENOUS ONCE
Status: COMPLETED | OUTPATIENT
Start: 2025-01-09 | End: 2025-01-09

## 2025-01-09 RX ADMIN — SODIUM CHLORIDE, POTASSIUM CHLORIDE, SODIUM LACTATE AND CALCIUM CHLORIDE: 600; 310; 30; 20 INJECTION, SOLUTION INTRAVENOUS at 21:35

## 2025-01-09 RX ADMIN — SODIUM CHLORIDE 9000 MG: 9 INJECTION, SOLUTION INTRAVENOUS at 01:29

## 2025-01-09 RX ADMIN — ACETAMINOPHEN 1000 MG: 500 TABLET ORAL at 08:41

## 2025-01-09 RX ADMIN — ACETAMINOPHEN 1000 MG: 500 TABLET ORAL at 00:12

## 2025-01-09 RX ADMIN — ONDANSETRON 4 MG: 4 TABLET, ORALLY DISINTEGRATING ORAL at 18:57

## 2025-01-09 RX ADMIN — SODIUM CHLORIDE, POTASSIUM CHLORIDE, SODIUM LACTATE AND CALCIUM CHLORIDE: 600; 310; 30; 20 INJECTION, SOLUTION INTRAVENOUS at 05:56

## 2025-01-09 RX ADMIN — SODIUM CHLORIDE, POTASSIUM CHLORIDE, SODIUM LACTATE AND CALCIUM CHLORIDE 1000 ML: 600; 310; 30; 20 INJECTION, SOLUTION INTRAVENOUS at 04:55

## 2025-01-09 RX ADMIN — GENTAMICIN SULFATE 540 MG: 40 INJECTION, SOLUTION INTRAMUSCULAR; INTRAVENOUS at 13:25

## 2025-01-09 RX ADMIN — OXYCODONE HYDROCHLORIDE AND ACETAMINOPHEN 2 TABLET: 5; 325 TABLET ORAL at 21:22

## 2025-01-09 RX ADMIN — ONDANSETRON 4 MG: 4 TABLET, ORALLY DISINTEGRATING ORAL at 09:20

## 2025-01-09 RX ADMIN — OXYCODONE HYDROCHLORIDE 5 MG: 5 TABLET ORAL at 17:58

## 2025-01-09 RX ADMIN — MEROPENEM 2000 MG: 1 INJECTION, POWDER, FOR SOLUTION INTRAVENOUS at 18:58

## 2025-01-09 RX ADMIN — CEFEPIME 2000 MG: 2 INJECTION, POWDER, FOR SOLUTION INTRAVENOUS at 08:42

## 2025-01-09 RX ADMIN — ACETAMINOPHEN 1000 MG: 500 TABLET ORAL at 14:30

## 2025-01-09 RX ADMIN — MEROPENEM 2000 MG: 1 INJECTION, POWDER, FOR SOLUTION INTRAVENOUS at 10:00

## 2025-01-09 RX ADMIN — CEFEPIME 2000 MG: 2 INJECTION, POWDER, FOR SOLUTION INTRAVENOUS at 00:52

## 2025-01-09 RX ADMIN — OXYCODONE HYDROCHLORIDE 5 MG: 5 TABLET ORAL at 03:19

## 2025-01-09 RX ADMIN — KETOROLAC TROMETHAMINE 30 MG: 30 INJECTION, SOLUTION INTRAMUSCULAR; INTRAVENOUS at 21:22

## 2025-01-09 RX ADMIN — MINOCYCLINE HYDROCHLORIDE 200 MG: 100 CAPSULE ORAL at 08:41

## 2025-01-09 RX ADMIN — OXYCODONE HYDROCHLORIDE 5 MG: 5 TABLET ORAL at 09:58

## 2025-01-09 RX ADMIN — SODIUM CHLORIDE, POTASSIUM CHLORIDE, SODIUM LACTATE AND CALCIUM CHLORIDE: 600; 310; 30; 20 INJECTION, SOLUTION INTRAVENOUS at 13:26

## 2025-01-09 ASSESSMENT — PAIN DESCRIPTION - ORIENTATION
ORIENTATION: LOWER
ORIENTATION: LOWER;UPPER
ORIENTATION: MID

## 2025-01-09 ASSESSMENT — PAIN SCALES - GENERAL
PAINLEVEL_OUTOF10: 4
PAINLEVEL_OUTOF10: 10
PAINLEVEL_OUTOF10: 3
PAINLEVEL_OUTOF10: 5
PAINLEVEL_OUTOF10: 4
PAINLEVEL_OUTOF10: 4
PAINLEVEL_OUTOF10: 1
PAINLEVEL_OUTOF10: 3
PAINLEVEL_OUTOF10: 3
PAINLEVEL_OUTOF10: 5
PAINLEVEL_OUTOF10: 3
PAINLEVEL_OUTOF10: 1
PAINLEVEL_OUTOF10: 6
PAINLEVEL_OUTOF10: 3

## 2025-01-09 ASSESSMENT — PAIN DESCRIPTION - PAIN TYPE: TYPE: SURGICAL PAIN;ACUTE PAIN

## 2025-01-09 ASSESSMENT — PAIN DESCRIPTION - LOCATION
LOCATION: INCISION
LOCATION: INCISION
LOCATION: ABDOMEN;SHOULDER
LOCATION: GENERALIZED
LOCATION: INCISION
LOCATION: INCISION
LOCATION: ABDOMEN

## 2025-01-09 ASSESSMENT — PAIN - FUNCTIONAL ASSESSMENT
PAIN_FUNCTIONAL_ASSESSMENT: ACTIVITIES ARE NOT PREVENTED
PAIN_FUNCTIONAL_ASSESSMENT: ACTIVITIES ARE NOT PREVENTED

## 2025-01-09 ASSESSMENT — PAIN DESCRIPTION - DESCRIPTORS
DESCRIPTORS: ACHING
DESCRIPTORS: ACHING
DESCRIPTORS: DISCOMFORT;SHARP
DESCRIPTORS: ACHING

## 2025-01-09 NOTE — OP NOTE
PATIENT:    Marissa Flores    PREOPERATIVE DIAGNOSES:  1.   31w3d        2.  Nonreassuring fetal heart tones      POSTOPERATIVE DIAGNOSES:  Same      PROCEDURE:     Primary low transverse  section.      SURGEON:     Minh Banerjee MD      ESTIMATED BLOOD LOSS:   1366 mL.      COMPLICATIONS:     None.         ANESTHESIA:    Spinal.         FINDINGS:   Live female         Infant Apgars 5 and 9 at 1 and 5 min respectively.      Weight: 4 lbs 5 oz.        Normal tubes and ovaries bilaterally.         DETAILS OF PROCEDURE:   She was then placed in the supine position slightly tilted to the left. Abdomen was scrubbed and draped in the usual manner.  general anesthesia was performed.  Anesthesia level was checked and was found to be adequate. The abdomen was entered thru a transverse incision The Bovie was used to go through the subcutaneous tissue. The fascia was entered in the same plane as the skin incision and  from the underlying rectus abdominis muscles which were  in the midline exposing the parietal peritoneum. The peritoneum was opened sharply in a longitudinal fashion. A bladder retractor was placed in position and the visceral peritoneum overlying the lower uterine segment was opened transversely and a bladder flap was developed in a sharp manner. The lower uterine segment was opened in a low transverse fashion. The amniotic cavity was entered and Clear amniotic fluid was suctioned out. The baby was then delivered from the Cephalic without any complications.The baby was handed over to the nursery nurse. Cord blood was saved. The placenta was then removed manually and the endometrial cavity was swept clean by a wet lap. The edges of the uterine incision were grasped by Allis clamps and the incision itself was closed using a continuous locked suture of 0 vicryl. Tubes and ovaries were inspected and appeared to be normal. The gutters were cleared of any blood clots and debris. The

## 2025-01-09 NOTE — CONSENT
Informed Consent for Blood Component Transfusion Note    I have discussed with the patient the rationale for blood component transfusion; its benefits in treating or preventing fatigue, organ damage, or death; and its risk which includes mild transfusion reactions, rare risk of blood borne infection, or more serious but rare reactions. I have discussed the alternatives to transfusion, including the risk and consequences of not receiving transfusion. The patient had an opportunity to ask questions and had agreed to proceed with transfusion of blood components.    Electronically signed by Sasha Corona MD on 1/9/25 at 4:23 AM EST

## 2025-01-09 NOTE — ANESTHESIA PROCEDURE NOTES
Airway  Date/Time: 1/8/2025 12:04 PM  Urgency: emergent    Airway not difficult    General Information and Staff    Patient location during procedure: OR  Resident/CRNA: Ortega Valles APRN - CRNA  Performed: resident/CRNA/CAA   Performed by: Stella Hughes APRN - CRNA  Authorized by: Moi Vera MD      Consent for Airway (if performed for an anesthetic, see related documentation for consents)  Consent: The procedure was performed in an emergent situation. Verbal consent not obtained. Written consent not obtained.  Risks and benefits: risks, benefits and alternatives were not discussed      Code status verified:yes  Indications and Patient Condition  Indications for airway management: anesthesia  Spontaneous Ventilation: absent  Sedation level: deep  Preoxygenated: yes  Patient position: sniffing  MILS maintained throughout  Mask difficulty assessment: not attempted    Final Airway Details  Final airway type: endotracheal airway      Successful airway: ETT  Cuffed: yes   Successful intubation technique: direct laryngoscopy  Endotracheal tube insertion site: oral  Blade: Geovanni  Blade size: #3  ETT size (mm): 7.0  Cormack-Lehane Classification: grade I - full view of glottis  Placement verified by: chest auscultation and capnometry   Measured from: teeth  ETT to teeth (cm): 21  Number of attempts at approach: 1

## 2025-01-09 NOTE — ANESTHESIA POSTPROCEDURE EVALUATION
Department of Anesthesiology  Postprocedure Note    Patient: Marissa Flores  MRN: [id number]  YOB: 1990  Date of evaluation: 2025    Procedure Summary       Date: 25 Room / Location: [facility] OR [address] [place] [address]    Anesthesia Start: 1155 Anesthesia Stop: 1257    Procedures:        SECTION (Abdomen)      Labor Analgesia Diagnosis:       Pregnancy related condition in third trimester      (Pregnancy related condition in third trimester [O26.93])    Surgeons: Minh Banerjee MD Responsible Provider: Moi Vera MD    Anesthesia Type: general ASA Status: 3 - Emergent            Anesthesia Type: No value filed.    Eliza Phase I: Eliza Score: 9    Eliza Phase II: Eliza Score: 10    Anesthesia Post Evaluation    Patient location during evaluation: ICU  Patient participation: complete - patient participated  Level of consciousness: awake and alert  Pain score: 5  Airway patency: patent  Nausea & Vomiting: no nausea and no vomiting  Cardiovascular status: hemodynamically stable  Respiratory status: room air  Hydration status: stable  Comments: Pt remains in ICU on IV abx therapy, no anesthesia related complications noted. Pain with movement managed well with meds per pt  Pain management: adequate and satisfactory to patient    No notable events documented.

## 2025-01-09 NOTE — CARE COORDINATION
LSW in to see mother of baby due to support for infant being transferred to [facility]. LSW met with mother of baby, mother of baby's brother, and mother of baby's parents at bedside, introduced self and role. Infant girl: Jonh Atwood is mother of baby's 4th child, lives with FOB, 12 year old, 9 year old, and 6 year old at home. Mother of baby has all necessary supplies for infant. Mother of baby is planning on breast feeding, is not connected with [facility], will not qualify. Mother of baby has regular transportation. Mother of baby is planning on using [facility] for pediatric care. Mother of baby confirmed history of anxiety and depression. Mother of baby stated that she has had very bad anxiety and depression throughout her pregnancy due to all of the medical complications. Mother of baby stated that she plans on seeing a therapist after she gets discharged to help process the pregnancy and birth and to help with any other symptoms. LSW discussed baby blues versus postpartum depression with mother of baby, discussed warning signs and symptoms of postpartum depression. Mother of baby verbalized understanding of above.     Mother of baby has been able to contact [facility] and speak with nurses and get updates on infant. Mother of baby stated that she would like to stay in infant's room in [facility] but does not want to decide until she is discharged. LSW provided emotional support. No other questions or needs stated at this time. LSW to remain available.

## 2025-01-10 PROBLEM — T82.7XXD: Status: ACTIVE | Noted: 2025-01-10

## 2025-01-10 PROBLEM — A41.9: Status: ACTIVE | Noted: 2025-01-10

## 2025-01-10 LAB
ABO/RH: NORMAL
ANION GAP SERPL CALCULATED.3IONS-SCNC: 8 MMOL/L (ref 9–17)
ANTIBODY SCREEN: NEGATIVE
BASOPHILS # BLD: 0.05 K/UL
BASOPHILS NFR BLD: 1 % (ref 0–1)
BLOOD BANK BLOOD PRODUCT EXPIRATION DATE: NORMAL
BLOOD BANK DISPENSE STATUS: NORMAL
BLOOD BANK ISBT PRODUCT BLOOD TYPE: 600
BLOOD BANK PRODUCT CODE: NORMAL
BLOOD BANK SAMPLE EXPIRATION: NORMAL
BLOOD BANK UNIT TYPE AND RH: NORMAL
BPU ID: NORMAL
BUN SERPL-MCNC: 13 MG/DL (ref 7–20)
CALCIUM SERPL-MCNC: 9.2 MG/DL (ref 8.3–10.6)
CHLORIDE SERPL-SCNC: 106 MMOL/L (ref 99–110)
CO2 SERPL-SCNC: 22 MMOL/L (ref 21–32)
COMPONENT: NORMAL
CREAT SERPL-MCNC: 0.6 MG/DL (ref 0.6–1.1)
CROSSMATCH RESULT: NORMAL
CRP SERPL HS-MCNC: 57.1 MG/L (ref 0–5)
EKG ATRIAL RATE: 110 BPM
EKG ATRIAL RATE: 153 BPM
EKG DIAGNOSIS: NORMAL
EKG DIAGNOSIS: NORMAL
EKG P AXIS: -8 DEGREES
EKG P AXIS: 61 DEGREES
EKG P-R INTERVAL: 116 MS
EKG P-R INTERVAL: 64 MS
EKG Q-T INTERVAL: 320 MS
EKG Q-T INTERVAL: 346 MS
EKG QRS DURATION: 72 MS
EKG QRS DURATION: 92 MS
EKG QTC CALCULATION (BAZETT): 468 MS
EKG QTC CALCULATION (BAZETT): 510 MS
EKG R AXIS: 39 DEGREES
EKG R AXIS: 52 DEGREES
EKG T AXIS: 46 DEGREES
EKG T AXIS: 46 DEGREES
EKG VENTRICULAR RATE: 110 BPM
EKG VENTRICULAR RATE: 153 BPM
EOSINOPHIL # BLD: 0.18 K/UL
EOSINOPHILS RELATIVE PERCENT: 3 % (ref 0–3)
ERYTHROCYTE [DISTWIDTH] IN BLOOD BY AUTOMATED COUNT: 14.2 % (ref 11.7–14.9)
ERYTHROCYTE [SEDIMENTATION RATE] IN BLOOD BY WESTERGREN METHOD: 6 MM/HR (ref 0–20)
GENT RANDOM DATE LAST DOSE: ABNORMAL
GENT RANDOM DATE LAST DOSE: ABNORMAL
GENT RANDOM DOSE AMOUNT: ABNORMAL
GENT RANDOM DOSE AMOUNT: ABNORMAL
GENTAMICIN SERPL-MCNC: 23.7 UG/ML (ref 5–10)
GENTAMICIN SERPL-MCNC: 4.3 UG/ML (ref 5–10)
GFR, ESTIMATED: >90 ML/MIN/1.73M2
GLUCOSE SERPL-MCNC: 90 MG/DL (ref 74–99)
HCT VFR BLD AUTO: 23.3 % (ref 37–47)
HGB BLD-MCNC: 7.3 G/DL (ref 12.5–16)
IMM GRANULOCYTES # BLD AUTO: 0.09 K/UL
IMM GRANULOCYTES NFR BLD: 1 %
LYMPHOCYTES NFR BLD: 1.73 K/UL
LYMPHOCYTES RELATIVE PERCENT: 27 % (ref 24–44)
MCH RBC QN AUTO: 27.3 PG (ref 27–31)
MCHC RBC AUTO-ENTMCNC: 31.3 G/DL (ref 32–36)
MCV RBC AUTO: 87.3 FL (ref 78–100)
MICROORGANISM SPEC CULT: ABNORMAL
MICROORGANISM SPEC CULT: ABNORMAL
MICROORGANISM/AGENT SPEC: ABNORMAL
MICROORGANISM/AGENT SPEC: ABNORMAL
MONOCYTES NFR BLD: 0.21 K/UL
MONOCYTES NFR BLD: 3 % (ref 0–4)
NEUTROPHILS NFR BLD: 65 % (ref 36–66)
NEUTS SEG NFR BLD: 4.17 K/UL
PLATELET, FLUORESCENCE: 108 K/UL (ref 140–440)
PMV BLD AUTO: 12.7 FL (ref 7.5–11.1)
POTASSIUM SERPL-SCNC: 3.5 MMOL/L (ref 3.5–5.1)
PROCALCITONIN SERPL-MCNC: 4.67 NG/ML
RBC # BLD AUTO: 2.67 M/UL (ref 4.2–5.4)
SERVICE CMNT-IMP: ABNORMAL
SERVICE CMNT-IMP: ABNORMAL
SODIUM SERPL-SCNC: 135 MMOL/L (ref 136–145)
SPECIMEN DESCRIPTION: ABNORMAL
SPECIMEN DESCRIPTION: ABNORMAL
SURGICAL PATHOLOGY REPORT: NORMAL
TME LAST DOSE: ABNORMAL H
TME LAST DOSE: ABNORMAL H
TRANSFUSION STATUS: NORMAL
UNIT DIVISION: 0
UNIT ISSUE DATE/TIME: NORMAL
WBC OTHER # BLD: 6.4 K/UL (ref 4–10.5)

## 2025-01-10 PROCEDURE — 87040 BLOOD CULTURE FOR BACTERIA: CPT

## 2025-01-10 PROCEDURE — 2580000003 HC RX 258: Performed by: INTERNAL MEDICINE

## 2025-01-10 PROCEDURE — 6360000002 HC RX W HCPCS: Performed by: OBSTETRICS & GYNECOLOGY

## 2025-01-10 PROCEDURE — 6370000000 HC RX 637 (ALT 250 FOR IP): Performed by: INTERNAL MEDICINE

## 2025-01-10 PROCEDURE — 80048 BASIC METABOLIC PNL TOTAL CA: CPT

## 2025-01-10 PROCEDURE — 86140 C-REACTIVE PROTEIN: CPT

## 2025-01-10 PROCEDURE — 6370000000 HC RX 637 (ALT 250 FOR IP): Performed by: OBSTETRICS & GYNECOLOGY

## 2025-01-10 PROCEDURE — 36415 COLL VENOUS BLD VENIPUNCTURE: CPT

## 2025-01-10 PROCEDURE — 85025 COMPLETE CBC W/AUTO DIFF WBC: CPT

## 2025-01-10 PROCEDURE — 93010 ELECTROCARDIOGRAM REPORT: CPT | Performed by: INTERNAL MEDICINE

## 2025-01-10 PROCEDURE — 84145 PROCALCITONIN (PCT): CPT

## 2025-01-10 PROCEDURE — 80170 ASSAY OF GENTAMICIN: CPT

## 2025-01-10 PROCEDURE — 6370000000 HC RX 637 (ALT 250 FOR IP): Performed by: STUDENT IN AN ORGANIZED HEALTH CARE EDUCATION/TRAINING PROGRAM

## 2025-01-10 PROCEDURE — 6360000002 HC RX W HCPCS: Performed by: INTERNAL MEDICINE

## 2025-01-10 PROCEDURE — 6360000002 HC RX W HCPCS: Performed by: SPECIALIST

## 2025-01-10 PROCEDURE — 2580000003 HC RX 258: Performed by: STUDENT IN AN ORGANIZED HEALTH CARE EDUCATION/TRAINING PROGRAM

## 2025-01-10 PROCEDURE — 94761 N-INVAS EAR/PLS OXIMETRY MLT: CPT

## 2025-01-10 PROCEDURE — 1200000000 HC SEMI PRIVATE

## 2025-01-10 PROCEDURE — 6360000002 HC RX W HCPCS: Performed by: STUDENT IN AN ORGANIZED HEALTH CARE EDUCATION/TRAINING PROGRAM

## 2025-01-10 PROCEDURE — 6370000000 HC RX 637 (ALT 250 FOR IP)

## 2025-01-10 PROCEDURE — 94150 VITAL CAPACITY TEST: CPT

## 2025-01-10 PROCEDURE — 2580000003 HC RX 258: Performed by: SPECIALIST

## 2025-01-10 PROCEDURE — 99232 SBSQ HOSP IP/OBS MODERATE 35: CPT | Performed by: NURSE PRACTITIONER

## 2025-01-10 PROCEDURE — 85652 RBC SED RATE AUTOMATED: CPT

## 2025-01-10 RX ORDER — DOCUSATE SODIUM 100 MG/1
100 CAPSULE, LIQUID FILLED ORAL ONCE
Status: COMPLETED | OUTPATIENT
Start: 2025-01-10 | End: 2025-01-10

## 2025-01-10 RX ADMIN — Medication 1 EACH: at 23:21

## 2025-01-10 RX ADMIN — KETOROLAC TROMETHAMINE 30 MG: 30 INJECTION, SOLUTION INTRAMUSCULAR; INTRAVENOUS at 03:00

## 2025-01-10 RX ADMIN — OXYCODONE HYDROCHLORIDE AND ACETAMINOPHEN 2 TABLET: 5; 325 TABLET ORAL at 02:17

## 2025-01-10 RX ADMIN — Medication 1 EACH: at 15:59

## 2025-01-10 RX ADMIN — GENTAMICIN SULFATE 800 MG: 40 INJECTION, SOLUTION INTRAMUSCULAR; INTRAVENOUS at 13:40

## 2025-01-10 RX ADMIN — ONDANSETRON 4 MG: 4 TABLET, ORALLY DISINTEGRATING ORAL at 02:28

## 2025-01-10 RX ADMIN — OXYCODONE HYDROCHLORIDE AND ACETAMINOPHEN 2 TABLET: 5; 325 TABLET ORAL at 16:59

## 2025-01-10 RX ADMIN — MEROPENEM 2000 MG: 1 INJECTION, POWDER, FOR SOLUTION INTRAVENOUS at 18:45

## 2025-01-10 RX ADMIN — MEROPENEM 2000 MG: 1 INJECTION, POWDER, FOR SOLUTION INTRAVENOUS at 10:27

## 2025-01-10 RX ADMIN — KETOROLAC TROMETHAMINE 30 MG: 30 INJECTION, SOLUTION INTRAMUSCULAR; INTRAVENOUS at 14:47

## 2025-01-10 RX ADMIN — ONDANSETRON 4 MG: 2 INJECTION INTRAMUSCULAR; INTRAVENOUS at 20:08

## 2025-01-10 RX ADMIN — OXYCODONE HYDROCHLORIDE AND ACETAMINOPHEN 2 TABLET: 5; 325 TABLET ORAL at 21:19

## 2025-01-10 RX ADMIN — MEROPENEM 2000 MG: 1 INJECTION, POWDER, FOR SOLUTION INTRAVENOUS at 02:14

## 2025-01-10 RX ADMIN — SODIUM CHLORIDE, POTASSIUM CHLORIDE, SODIUM LACTATE AND CALCIUM CHLORIDE: 600; 310; 30; 20 INJECTION, SOLUTION INTRAVENOUS at 14:32

## 2025-01-10 RX ADMIN — KETOROLAC TROMETHAMINE 30 MG: 30 INJECTION, SOLUTION INTRAMUSCULAR; INTRAVENOUS at 10:39

## 2025-01-10 RX ADMIN — OXYCODONE HYDROCHLORIDE AND ACETAMINOPHEN 2 TABLET: 5; 325 TABLET ORAL at 08:06

## 2025-01-10 RX ADMIN — KETOROLAC TROMETHAMINE 30 MG: 30 INJECTION, SOLUTION INTRAMUSCULAR; INTRAVENOUS at 21:19

## 2025-01-10 RX ADMIN — DOCUSATE SODIUM 100 MG: 100 CAPSULE, LIQUID FILLED ORAL at 03:01

## 2025-01-10 ASSESSMENT — PAIN DESCRIPTION - LOCATION
LOCATION: ABDOMEN

## 2025-01-10 ASSESSMENT — PAIN SCALES - GENERAL
PAINLEVEL_OUTOF10: 7
PAINLEVEL_OUTOF10: 8
PAINLEVEL_OUTOF10: 8
PAINLEVEL_OUTOF10: 6
PAINLEVEL_OUTOF10: 3
PAINLEVEL_OUTOF10: 10

## 2025-01-11 LAB
ACB COMPLEX DNA BLD POS QL NAA+NON-PROBE: DETECTED
B FRAGILIS DNA BLD POS QL NAA+NON-PROBE: NOT DETECTED
BASOPHILS # BLD: 0.05 K/UL
BASOPHILS NFR BLD: 1 % (ref 0–1)
BLACTX-M ISLT/SPM QL: NOT DETECTED
BLAIMP ISLT/SPM QL: NOT DETECTED
BLAKPC ISLT/SPM QL: NOT DETECTED
BLAVIM ISLT/SPM QL: NOT DETECTED
C ALBICANS DNA BLD POS QL NAA+NON-PROBE: NOT DETECTED
C AURIS DNA BLD POS QL NAA+NON-PROBE: NOT DETECTED
C GATTII+NEOFOR DNA BLD POS QL NAA+N-PRB: NOT DETECTED
C GLABRATA DNA BLD POS QL NAA+NON-PROBE: NOT DETECTED
C KRUSEI DNA BLD POS QL NAA+NON-PROBE: NOT DETECTED
C PARAP DNA BLD POS QL NAA+NON-PROBE: NOT DETECTED
C TROPICLS DNA BLD POS QL NAA+NON-PROBE: NOT DETECTED
CRP SERPL HS-MCNC: 32.7 MG/L (ref 0–5)
E CLOAC COMP DNA BLD POS NAA+NON-PROBE: NOT DETECTED
E COLI DNA BLD POS QL NAA+NON-PROBE: NOT DETECTED
E FAECALIS DNA BLD POS QL NAA+NON-PROBE: NOT DETECTED
E FAECIUM DNA BLD POS QL NAA+NON-PROBE: NOT DETECTED
ENTEROBACTERALES DNA BLD POS NAA+N-PRB: NOT DETECTED
EOSINOPHIL # BLD: 0.25 K/UL
EOSINOPHILS RELATIVE PERCENT: 5 % (ref 0–3)
ERYTHROCYTE [DISTWIDTH] IN BLOOD BY AUTOMATED COUNT: 14.2 % (ref 11.7–14.9)
ERYTHROCYTE [SEDIMENTATION RATE] IN BLOOD BY WESTERGREN METHOD: 7 MM/HR (ref 0–20)
GP B STREP DNA BLD POS QL NAA+NON-PROBE: NOT DETECTED
HAEM INFLU DNA BLD POS QL NAA+NON-PROBE: NOT DETECTED
HCT VFR BLD AUTO: 23 % (ref 37–47)
HGB BLD-MCNC: 7.4 G/DL (ref 12.5–16)
IMM GRANULOCYTES # BLD AUTO: 0.11 K/UL
IMM GRANULOCYTES NFR BLD: 2 %
K OXYTOCA DNA BLD POS QL NAA+NON-PROBE: NOT DETECTED
KLEBSIELLA SP DNA BLD POS QL NAA+NON-PRB: NOT DETECTED
KLEBSIELLA SP DNA BLD POS QL NAA+NON-PRB: NOT DETECTED
L MONOCYTOG DNA BLD POS QL NAA+NON-PROBE: NOT DETECTED
LYMPHOCYTES NFR BLD: 1.64 K/UL
LYMPHOCYTES RELATIVE PERCENT: 31 % (ref 24–44)
MCH RBC QN AUTO: 27.5 PG (ref 27–31)
MCHC RBC AUTO-ENTMCNC: 32.2 G/DL (ref 32–36)
MCV RBC AUTO: 85.5 FL (ref 78–100)
MICROORGANISM SPEC CULT: ABNORMAL
MICROORGANISM SPEC CULT: ABNORMAL
MICROORGANISM/AGENT SPEC: ABNORMAL
MONOCYTES NFR BLD: 0.3 K/UL
MONOCYTES NFR BLD: 6 % (ref 0–4)
N MEN DNA BLD POS QL NAA+NON-PROBE: NOT DETECTED
NEUTROPHILS NFR BLD: 56 % (ref 36–66)
NEUTS SEG NFR BLD: 2.94 K/UL
P AERUGINOSA DNA BLD POS NAA+NON-PROBE: NOT DETECTED
PLATELET # BLD AUTO: 112 K/UL (ref 140–440)
PMV BLD AUTO: 12.3 FL (ref 7.5–11.1)
PROCALCITONIN SERPL-MCNC: 2.48 NG/ML
PROTEUS SP DNA BLD POS QL NAA+NON-PROBE: NOT DETECTED
RBC # BLD AUTO: 2.69 M/UL (ref 4.2–5.4)
RESISTANT GENE NDM BY PCR: NOT DETECTED
S AUREUS DNA BLD POS QL NAA+NON-PROBE: NOT DETECTED
S AUREUS+CONS DNA BLD POS NAA+NON-PROBE: NOT DETECTED
S EPIDERMIDIS DNA BLD POS QL NAA+NON-PRB: NOT DETECTED
S LUGDUNENSIS DNA BLD POS QL NAA+NON-PRB: NOT DETECTED
S MALTOPHILIA DNA BLD POS QL NAA+NON-PRB: NOT DETECTED
S MARCESCENS DNA BLD POS NAA+NON-PROBE: NOT DETECTED
S PNEUM DNA BLD POS QL NAA+NON-PROBE: NOT DETECTED
S PYO DNA BLD POS QL NAA+NON-PROBE: ABNORMAL
SALMONELLA DNA BLD POS QL NAA+NON-PROBE: NOT DETECTED
SERVICE CMNT-IMP: ABNORMAL
SPECIMEN DESCRIPTION: ABNORMAL
STREPTOCOCCUS DNA BLD POS NAA+NON-PROBE: NOT DETECTED
WBC OTHER # BLD: 5.3 K/UL (ref 4–10.5)

## 2025-01-11 PROCEDURE — 2580000003 HC RX 258: Performed by: OBSTETRICS & GYNECOLOGY

## 2025-01-11 PROCEDURE — 86140 C-REACTIVE PROTEIN: CPT

## 2025-01-11 PROCEDURE — 6370000000 HC RX 637 (ALT 250 FOR IP): Performed by: STUDENT IN AN ORGANIZED HEALTH CARE EDUCATION/TRAINING PROGRAM

## 2025-01-11 PROCEDURE — 84145 PROCALCITONIN (PCT): CPT

## 2025-01-11 PROCEDURE — 1200000000 HC SEMI PRIVATE

## 2025-01-11 PROCEDURE — 6360000002 HC RX W HCPCS: Performed by: SPECIALIST

## 2025-01-11 PROCEDURE — 36415 COLL VENOUS BLD VENIPUNCTURE: CPT

## 2025-01-11 PROCEDURE — 6360000002 HC RX W HCPCS: Performed by: OBSTETRICS & GYNECOLOGY

## 2025-01-11 PROCEDURE — 85025 COMPLETE CBC W/AUTO DIFF WBC: CPT

## 2025-01-11 PROCEDURE — 94761 N-INVAS EAR/PLS OXIMETRY MLT: CPT

## 2025-01-11 PROCEDURE — 2580000003 HC RX 258: Performed by: INTERNAL MEDICINE

## 2025-01-11 PROCEDURE — 6370000000 HC RX 637 (ALT 250 FOR IP): Performed by: OBSTETRICS & GYNECOLOGY

## 2025-01-11 PROCEDURE — 6360000002 HC RX W HCPCS: Performed by: INTERNAL MEDICINE

## 2025-01-11 PROCEDURE — 85652 RBC SED RATE AUTOMATED: CPT

## 2025-01-11 PROCEDURE — 2580000003 HC RX 258: Performed by: SPECIALIST

## 2025-01-11 RX ORDER — FERROUS SULFATE 325(65) MG
325 TABLET ORAL 2 TIMES DAILY WITH MEALS
Status: DISCONTINUED | OUTPATIENT
Start: 2025-01-11 | End: 2025-01-11

## 2025-01-11 RX ORDER — DOCUSATE SODIUM 100 MG/1
100 CAPSULE, LIQUID FILLED ORAL DAILY PRN
Status: DISCONTINUED | OUTPATIENT
Start: 2025-01-11 | End: 2025-01-11

## 2025-01-11 RX ORDER — DOCUSATE SODIUM 100 MG/1
100 CAPSULE, LIQUID FILLED ORAL 2 TIMES DAILY
Status: DISCONTINUED | OUTPATIENT
Start: 2025-01-11 | End: 2025-01-14 | Stop reason: HOSPADM

## 2025-01-11 RX ADMIN — MEROPENEM 2000 MG: 1 INJECTION, POWDER, FOR SOLUTION INTRAVENOUS at 01:43

## 2025-01-11 RX ADMIN — MEROPENEM 2000 MG: 1 INJECTION, POWDER, FOR SOLUTION INTRAVENOUS at 17:57

## 2025-01-11 RX ADMIN — OXYCODONE HYDROCHLORIDE AND ACETAMINOPHEN 2 TABLET: 5; 325 TABLET ORAL at 20:56

## 2025-01-11 RX ADMIN — GENTAMICIN SULFATE 800 MG: 40 INJECTION, SOLUTION INTRAMUSCULAR; INTRAVENOUS at 16:35

## 2025-01-11 RX ADMIN — IRON SUCROSE 500 MG: 20 INJECTION, SOLUTION INTRAVENOUS at 13:06

## 2025-01-11 RX ADMIN — DOCUSATE SODIUM 100 MG: 100 CAPSULE, LIQUID FILLED ORAL at 09:56

## 2025-01-11 RX ADMIN — KETOROLAC TROMETHAMINE 30 MG: 30 INJECTION, SOLUTION INTRAMUSCULAR; INTRAVENOUS at 09:56

## 2025-01-11 RX ADMIN — KETOROLAC TROMETHAMINE 30 MG: 30 INJECTION, SOLUTION INTRAMUSCULAR; INTRAVENOUS at 04:12

## 2025-01-11 RX ADMIN — OXYCODONE HYDROCHLORIDE AND ACETAMINOPHEN 2 TABLET: 5; 325 TABLET ORAL at 10:08

## 2025-01-11 RX ADMIN — MEROPENEM 2000 MG: 1 INJECTION, POWDER, FOR SOLUTION INTRAVENOUS at 10:02

## 2025-01-11 RX ADMIN — DOCUSATE SODIUM 100 MG: 100 CAPSULE, LIQUID FILLED ORAL at 20:57

## 2025-01-11 RX ADMIN — KETOROLAC TROMETHAMINE 30 MG: 30 INJECTION, SOLUTION INTRAMUSCULAR; INTRAVENOUS at 16:15

## 2025-01-11 RX ADMIN — KETOROLAC TROMETHAMINE 30 MG: 30 INJECTION, SOLUTION INTRAMUSCULAR; INTRAVENOUS at 20:57

## 2025-01-11 RX ADMIN — ONDANSETRON 4 MG: 4 TABLET, ORALLY DISINTEGRATING ORAL at 13:16

## 2025-01-11 ASSESSMENT — PAIN SCALES - GENERAL
PAINLEVEL_OUTOF10: 9
PAINLEVEL_OUTOF10: 6
PAINLEVEL_OUTOF10: 7

## 2025-01-11 ASSESSMENT — PAIN DESCRIPTION - PAIN TYPE: TYPE: ACUTE PAIN

## 2025-01-11 ASSESSMENT — PAIN DESCRIPTION - DESCRIPTORS
DESCRIPTORS: ACHING
DESCRIPTORS: DISCOMFORT

## 2025-01-11 ASSESSMENT — PAIN DESCRIPTION - LOCATION
LOCATION: BACK
LOCATION: GENERALIZED
LOCATION: ABDOMEN

## 2025-01-11 ASSESSMENT — PAIN DESCRIPTION - ORIENTATION
ORIENTATION: MID
ORIENTATION: LOWER

## 2025-01-11 ASSESSMENT — PAIN SCALES - WONG BAKER: WONGBAKER_NUMERICALRESPONSE: NO HURT

## 2025-01-12 LAB
ALBUMIN SERPL-MCNC: 2.4 G/DL (ref 3.4–5)
ALBUMIN SERPL-MCNC: 2.5 G/DL (ref 3.4–5)
ALBUMIN/GLOB SERPL: 0.9 {RATIO} (ref 1.1–2.2)
ALBUMIN/GLOB SERPL: 1 {RATIO} (ref 1.1–2.2)
ALP SERPL-CCNC: 290 U/L (ref 40–129)
ALP SERPL-CCNC: 291 U/L (ref 40–129)
ALT SERPL-CCNC: 36 U/L (ref 10–40)
ALT SERPL-CCNC: 36 U/L (ref 10–40)
ANION GAP SERPL CALCULATED.3IONS-SCNC: 8 MMOL/L (ref 9–17)
AST SERPL-CCNC: 49 U/L (ref 15–37)
AST SERPL-CCNC: 51 U/L (ref 15–37)
B PARAP IS1001 DNA NPH QL NAA+NON-PROBE: NOT DETECTED
B PERT DNA SPEC QL NAA+PROBE: NOT DETECTED
BASOPHILS # BLD: 0 K/UL
BASOPHILS NFR BLD: 0 % (ref 0–1)
BILIRUB DIRECT SERPL-MCNC: <0.2 MG/DL (ref 0–0.3)
BILIRUB INDIRECT SERPL-MCNC: ABNORMAL MG/DL (ref 0–0.7)
BILIRUB SERPL-MCNC: 0.3 MG/DL (ref 0–1)
BILIRUB SERPL-MCNC: 0.3 MG/DL (ref 0–1)
BUN SERPL-MCNC: 9 MG/DL (ref 7–20)
C PNEUM DNA NPH QL NAA+NON-PROBE: NOT DETECTED
CALCIUM SERPL-MCNC: 9.1 MG/DL (ref 8.3–10.6)
CHLORIDE SERPL-SCNC: 108 MMOL/L (ref 99–110)
CO2 SERPL-SCNC: 25 MMOL/L (ref 21–32)
CREAT SERPL-MCNC: 0.5 MG/DL (ref 0.6–1.1)
CRP SERPL HS-MCNC: 24.4 MG/L (ref 0–5)
EOSINOPHIL # BLD: 0.08 K/UL
EOSINOPHILS RELATIVE PERCENT: 1 % (ref 0–3)
ERYTHROCYTE [DISTWIDTH] IN BLOOD BY AUTOMATED COUNT: 14.2 % (ref 11.7–14.9)
ERYTHROCYTE [SEDIMENTATION RATE] IN BLOOD BY WESTERGREN METHOD: 11 MM/HR (ref 0–20)
FLUAV RNA NPH QL NAA+NON-PROBE: NOT DETECTED
FLUBV RNA NPH QL NAA+NON-PROBE: NOT DETECTED
GENT RANDOM DATE LAST DOSE: ABNORMAL
GENT RANDOM DATE LAST DOSE: NORMAL
GENT RANDOM DOSE AMOUNT: ABNORMAL
GENT RANDOM DOSE AMOUNT: NORMAL
GENTAMICIN SERPL-MCNC: 0.8 UG/ML (ref 5–10)
GENTAMICIN SERPL-MCNC: NORMAL UG/ML (ref 5–10)
GFR, ESTIMATED: >90 ML/MIN/1.73M2
GLUCOSE SERPL-MCNC: 79 MG/DL (ref 74–99)
HADV DNA NPH QL NAA+NON-PROBE: NOT DETECTED
HCOV 229E RNA NPH QL NAA+NON-PROBE: NOT DETECTED
HCOV HKU1 RNA NPH QL NAA+NON-PROBE: NOT DETECTED
HCOV NL63 RNA NPH QL NAA+NON-PROBE: NOT DETECTED
HCOV OC43 RNA NPH QL NAA+NON-PROBE: NOT DETECTED
HCT VFR BLD AUTO: 25.3 % (ref 37–47)
HGB BLD-MCNC: 7.9 G/DL (ref 12.5–16)
HMPV RNA NPH QL NAA+NON-PROBE: NOT DETECTED
HPIV1 RNA NPH QL NAA+NON-PROBE: NOT DETECTED
HPIV2 RNA NPH QL NAA+NON-PROBE: NOT DETECTED
HPIV3 RNA NPH QL NAA+NON-PROBE: NOT DETECTED
HPIV4 RNA NPH QL NAA+NON-PROBE: NOT DETECTED
LYMPHOCYTES NFR BLD: 1.5 K/UL
LYMPHOCYTES RELATIVE PERCENT: 19 % (ref 24–44)
M PNEUMO DNA NPH QL NAA+NON-PROBE: NOT DETECTED
MCH RBC QN AUTO: 27.3 PG (ref 27–31)
MCHC RBC AUTO-ENTMCNC: 31.2 G/DL (ref 32–36)
MCV RBC AUTO: 87.5 FL (ref 78–100)
MONOCYTES NFR BLD: 0 % (ref 0–4)
MONOCYTES NFR BLD: 0 K/UL
NEUTROPHILS NFR BLD: 80 % (ref 36–66)
NEUTS SEG NFR BLD: 6.32 K/UL
NUCLEATED RED BLOOD CELLS: 1 PER 100 WBC
PLATELET # BLD AUTO: 162 K/UL (ref 140–440)
PLATELET ESTIMATE: NORMAL
PMV BLD AUTO: 11.8 FL (ref 7.5–11.1)
POTASSIUM SERPL-SCNC: 3.7 MMOL/L (ref 3.5–5.1)
PROCALCITONIN SERPL-MCNC: 1.07 NG/ML
PROT SERPL-MCNC: 4.9 G/DL (ref 6.4–8.2)
PROT SERPL-MCNC: 5 G/DL (ref 6.4–8.2)
RBC # BLD AUTO: 2.89 M/UL (ref 4.2–5.4)
RBC # BLD: NORMAL 10*6/UL
RSV RNA NPH QL NAA+NON-PROBE: NOT DETECTED
RV+EV RNA NPH QL NAA+NON-PROBE: NOT DETECTED
SARS-COV-2 RNA NPH QL NAA+NON-PROBE: NOT DETECTED
SODIUM SERPL-SCNC: 141 MMOL/L (ref 136–145)
SPECIMEN DESCRIPTION: NORMAL
TME LAST DOSE: ABNORMAL H
TME LAST DOSE: NORMAL H
WBC # BLD: NORMAL 10*3/UL
WBC OTHER # BLD: 7.9 K/UL (ref 4–10.5)

## 2025-01-12 PROCEDURE — 2580000003 HC RX 258: Performed by: INTERNAL MEDICINE

## 2025-01-12 PROCEDURE — 1200000000 HC SEMI PRIVATE

## 2025-01-12 PROCEDURE — 36410 VNPNXR 3YR/> PHY/QHP DX/THER: CPT

## 2025-01-12 PROCEDURE — 6360000002 HC RX W HCPCS: Performed by: INTERNAL MEDICINE

## 2025-01-12 PROCEDURE — 80170 ASSAY OF GENTAMICIN: CPT

## 2025-01-12 PROCEDURE — 05HB33Z INSERTION OF INFUSION DEVICE INTO RIGHT BASILIC VEIN, PERCUTANEOUS APPROACH: ICD-10-PCS | Performed by: OBSTETRICS & GYNECOLOGY

## 2025-01-12 PROCEDURE — 85025 COMPLETE CBC W/AUTO DIFF WBC: CPT

## 2025-01-12 PROCEDURE — 94761 N-INVAS EAR/PLS OXIMETRY MLT: CPT

## 2025-01-12 PROCEDURE — 86140 C-REACTIVE PROTEIN: CPT

## 2025-01-12 PROCEDURE — 2580000003 HC RX 258: Performed by: SPECIALIST

## 2025-01-12 PROCEDURE — 80076 HEPATIC FUNCTION PANEL: CPT

## 2025-01-12 PROCEDURE — 80053 COMPREHEN METABOLIC PANEL: CPT

## 2025-01-12 PROCEDURE — 0202U NFCT DS 22 TRGT SARS-COV-2: CPT

## 2025-01-12 PROCEDURE — 6360000002 HC RX W HCPCS: Performed by: OBSTETRICS & GYNECOLOGY

## 2025-01-12 PROCEDURE — 6370000000 HC RX 637 (ALT 250 FOR IP): Performed by: OBSTETRICS & GYNECOLOGY

## 2025-01-12 PROCEDURE — 36415 COLL VENOUS BLD VENIPUNCTURE: CPT

## 2025-01-12 PROCEDURE — C1751 CATH, INF, PER/CENT/MIDLINE: HCPCS

## 2025-01-12 PROCEDURE — 85652 RBC SED RATE AUTOMATED: CPT

## 2025-01-12 PROCEDURE — 76937 US GUIDE VASCULAR ACCESS: CPT

## 2025-01-12 PROCEDURE — 84145 PROCALCITONIN (PCT): CPT

## 2025-01-12 PROCEDURE — 6370000000 HC RX 637 (ALT 250 FOR IP): Performed by: STUDENT IN AN ORGANIZED HEALTH CARE EDUCATION/TRAINING PROGRAM

## 2025-01-12 PROCEDURE — 6360000002 HC RX W HCPCS: Performed by: SPECIALIST

## 2025-01-12 RX ADMIN — KETOROLAC TROMETHAMINE 30 MG: 30 INJECTION, SOLUTION INTRAMUSCULAR; INTRAVENOUS at 04:55

## 2025-01-12 RX ADMIN — MEROPENEM 2000 MG: 1 INJECTION, POWDER, FOR SOLUTION INTRAVENOUS at 10:05

## 2025-01-12 RX ADMIN — DOCUSATE SODIUM 100 MG: 100 CAPSULE, LIQUID FILLED ORAL at 20:59

## 2025-01-12 RX ADMIN — GENTAMICIN SULFATE 700 MG: 40 INJECTION, SOLUTION INTRAMUSCULAR; INTRAVENOUS at 15:57

## 2025-01-12 RX ADMIN — MEROPENEM 2000 MG: 1 INJECTION, POWDER, FOR SOLUTION INTRAVENOUS at 18:36

## 2025-01-12 RX ADMIN — DOCUSATE SODIUM 100 MG: 100 CAPSULE, LIQUID FILLED ORAL at 10:00

## 2025-01-12 RX ADMIN — OXYCODONE HYDROCHLORIDE AND ACETAMINOPHEN 2 TABLET: 5; 325 TABLET ORAL at 09:59

## 2025-01-12 RX ADMIN — KETOROLAC TROMETHAMINE 30 MG: 30 INJECTION, SOLUTION INTRAMUSCULAR; INTRAVENOUS at 20:59

## 2025-01-12 RX ADMIN — ONDANSETRON 4 MG: 4 TABLET, ORALLY DISINTEGRATING ORAL at 18:59

## 2025-01-12 RX ADMIN — KETOROLAC TROMETHAMINE 30 MG: 30 INJECTION, SOLUTION INTRAMUSCULAR; INTRAVENOUS at 15:55

## 2025-01-12 RX ADMIN — KETOROLAC TROMETHAMINE 30 MG: 30 INJECTION, SOLUTION INTRAMUSCULAR; INTRAVENOUS at 10:00

## 2025-01-12 RX ADMIN — MEROPENEM 2000 MG: 1 INJECTION, POWDER, FOR SOLUTION INTRAVENOUS at 02:12

## 2025-01-12 ASSESSMENT — PAIN DESCRIPTION - FREQUENCY: FREQUENCY: INTERMITTENT

## 2025-01-12 ASSESSMENT — PAIN DESCRIPTION - PAIN TYPE: TYPE: ACUTE PAIN

## 2025-01-12 ASSESSMENT — PAIN DESCRIPTION - ORIENTATION: ORIENTATION: MID

## 2025-01-12 ASSESSMENT — PAIN DESCRIPTION - LOCATION
LOCATION: BACK
LOCATION: ABDOMEN

## 2025-01-12 ASSESSMENT — PAIN SCALES - GENERAL
PAINLEVEL_OUTOF10: 5
PAINLEVEL_OUTOF10: 7

## 2025-01-12 ASSESSMENT — PAIN DESCRIPTION - ONSET: ONSET: GRADUAL

## 2025-01-12 ASSESSMENT — PAIN DESCRIPTION - DESCRIPTORS
DESCRIPTORS: ACHING
DESCRIPTORS: ACHING

## 2025-01-12 ASSESSMENT — PAIN SCALES - WONG BAKER: WONGBAKER_NUMERICALRESPONSE: NO HURT

## 2025-01-13 LAB
ALBUMIN SERPL-MCNC: 2.5 G/DL (ref 3.4–5)
ALBUMIN/GLOB SERPL: 1.1 {RATIO} (ref 1.1–2.2)
ALP SERPL-CCNC: 393 U/L (ref 40–129)
ALT SERPL-CCNC: 46 U/L (ref 10–40)
ANION GAP SERPL CALCULATED.3IONS-SCNC: 7 MMOL/L (ref 9–17)
AST SERPL-CCNC: 73 U/L (ref 15–37)
BASOPHILS # BLD: 0.04 K/UL
BASOPHILS NFR BLD: 0 % (ref 0–1)
BILIRUB DIRECT SERPL-MCNC: <0.2 MG/DL (ref 0–0.3)
BILIRUB INDIRECT SERPL-MCNC: ABNORMAL MG/DL (ref 0–0.7)
BILIRUB SERPL-MCNC: 0.2 MG/DL (ref 0–1)
BUN SERPL-MCNC: 7 MG/DL (ref 7–20)
CALCIUM SERPL-MCNC: 8.7 MG/DL (ref 8.3–10.6)
CHLORIDE SERPL-SCNC: 106 MMOL/L (ref 99–110)
CO2 SERPL-SCNC: 28 MMOL/L (ref 21–32)
CREAT SERPL-MCNC: 0.5 MG/DL (ref 0.6–1.1)
CRP SERPL HS-MCNC: 21.6 MG/L (ref 0–5)
EOSINOPHIL # BLD: 0.37 K/UL
EOSINOPHILS RELATIVE PERCENT: 5 % (ref 0–3)
ERYTHROCYTE [DISTWIDTH] IN BLOOD BY AUTOMATED COUNT: 14.5 % (ref 11.7–14.9)
GFR, ESTIMATED: >90 ML/MIN/1.73M2
GLUCOSE SERPL-MCNC: 74 MG/DL (ref 74–99)
HCT VFR BLD AUTO: 22.4 % (ref 37–47)
HGB BLD-MCNC: 7 G/DL (ref 12.5–16)
IMM GRANULOCYTES # BLD AUTO: 0.45 K/UL
IMM GRANULOCYTES NFR BLD: 6 %
LYMPHOCYTES NFR BLD: 2.45 K/UL
LYMPHOCYTES RELATIVE PERCENT: 34 % (ref 24–44)
MCH RBC QN AUTO: 27.8 PG (ref 27–31)
MCHC RBC AUTO-ENTMCNC: 31.3 G/DL (ref 32–36)
MCV RBC AUTO: 88.9 FL (ref 78–100)
MONOCYTES NFR BLD: 0.38 K/UL
MONOCYTES NFR BLD: 5 % (ref 0–4)
NEUTROPHILS NFR BLD: 48 % (ref 36–66)
NEUTS SEG NFR BLD: 3.47 K/UL
PLATELET # BLD AUTO: 165 K/UL (ref 140–440)
PMV BLD AUTO: 11.6 FL (ref 7.5–11.1)
POTASSIUM SERPL-SCNC: 3.7 MMOL/L (ref 3.5–5.1)
PROCALCITONIN SERPL-MCNC: 0.53 NG/ML
PROT SERPL-MCNC: 4.8 G/DL (ref 6.4–8.2)
RBC # BLD AUTO: 2.52 M/UL (ref 4.2–5.4)
SODIUM SERPL-SCNC: 141 MMOL/L (ref 136–145)
WBC OTHER # BLD: 6.8 K/UL (ref 4–10.5)

## 2025-01-13 PROCEDURE — 6370000000 HC RX 637 (ALT 250 FOR IP): Performed by: OBSTETRICS & GYNECOLOGY

## 2025-01-13 PROCEDURE — 6360000002 HC RX W HCPCS: Performed by: OBSTETRICS & GYNECOLOGY

## 2025-01-13 PROCEDURE — 85025 COMPLETE CBC W/AUTO DIFF WBC: CPT

## 2025-01-13 PROCEDURE — 6360000002 HC RX W HCPCS: Performed by: INTERNAL MEDICINE

## 2025-01-13 PROCEDURE — 30233N1 TRANSFUSION OF NONAUTOLOGOUS RED BLOOD CELLS INTO PERIPHERAL VEIN, PERCUTANEOUS APPROACH: ICD-10-PCS | Performed by: STUDENT IN AN ORGANIZED HEALTH CARE EDUCATION/TRAINING PROGRAM

## 2025-01-13 PROCEDURE — 2580000003 HC RX 258: Performed by: INTERNAL MEDICINE

## 2025-01-13 PROCEDURE — 6370000000 HC RX 637 (ALT 250 FOR IP): Performed by: STUDENT IN AN ORGANIZED HEALTH CARE EDUCATION/TRAINING PROGRAM

## 2025-01-13 PROCEDURE — 86140 C-REACTIVE PROTEIN: CPT

## 2025-01-13 PROCEDURE — 82248 BILIRUBIN DIRECT: CPT

## 2025-01-13 PROCEDURE — 84145 PROCALCITONIN (PCT): CPT

## 2025-01-13 PROCEDURE — 6360000002 HC RX W HCPCS: Performed by: STUDENT IN AN ORGANIZED HEALTH CARE EDUCATION/TRAINING PROGRAM

## 2025-01-13 PROCEDURE — 1220000000 HC SEMI PRIVATE OB R&B

## 2025-01-13 PROCEDURE — 80053 COMPREHEN METABOLIC PANEL: CPT

## 2025-01-13 PROCEDURE — 6370000000 HC RX 637 (ALT 250 FOR IP): Performed by: NURSE PRACTITIONER

## 2025-01-13 PROCEDURE — 99232 SBSQ HOSP IP/OBS MODERATE 35: CPT | Performed by: NURSE PRACTITIONER

## 2025-01-13 PROCEDURE — 36415 COLL VENOUS BLD VENIPUNCTURE: CPT

## 2025-01-13 RX ORDER — IBUPROFEN 800 MG/1
800 TABLET, FILM COATED ORAL EVERY 6 HOURS PRN
Qty: 30 TABLET | Refills: 1 | Status: SHIPPED | OUTPATIENT
Start: 2025-01-13

## 2025-01-13 RX ORDER — FERROUS SULFATE 325(65) MG
325 TABLET ORAL
Qty: 30 TABLET | Refills: 3 | Status: SHIPPED | OUTPATIENT
Start: 2025-01-13

## 2025-01-13 RX ORDER — OXYCODONE AND ACETAMINOPHEN 5; 325 MG/1; MG/1
1 TABLET ORAL EVERY 6 HOURS PRN
Qty: 20 TABLET | Refills: 0 | Status: SHIPPED | OUTPATIENT
Start: 2025-01-13 | End: 2025-01-16

## 2025-01-13 RX ORDER — ACETAMINOPHEN 500 MG
1000 TABLET ORAL EVERY 6 HOURS PRN
Qty: 120 TABLET | Refills: 0 | Status: SHIPPED | OUTPATIENT
Start: 2025-01-13

## 2025-01-13 RX ORDER — FERROUS SULFATE 325(65) MG
325 TABLET ORAL
Status: DISCONTINUED | OUTPATIENT
Start: 2025-01-13 | End: 2025-01-14 | Stop reason: HOSPADM

## 2025-01-13 RX ORDER — SULFAMETHOXAZOLE AND TRIMETHOPRIM 800; 160 MG/1; MG/1
1 TABLET ORAL EVERY 12 HOURS SCHEDULED
Status: DISCONTINUED | OUTPATIENT
Start: 2025-01-13 | End: 2025-01-14 | Stop reason: HOSPADM

## 2025-01-13 RX ADMIN — ONDANSETRON 4 MG: 2 INJECTION INTRAMUSCULAR; INTRAVENOUS at 09:21

## 2025-01-13 RX ADMIN — DOCUSATE SODIUM 100 MG: 100 CAPSULE, LIQUID FILLED ORAL at 09:07

## 2025-01-13 RX ADMIN — MEROPENEM 2000 MG: 1 INJECTION, POWDER, FOR SOLUTION INTRAVENOUS at 23:59

## 2025-01-13 RX ADMIN — OXYCODONE HYDROCHLORIDE AND ACETAMINOPHEN 2 TABLET: 5; 325 TABLET ORAL at 23:53

## 2025-01-13 RX ADMIN — KETOROLAC TROMETHAMINE 30 MG: 30 INJECTION, SOLUTION INTRAMUSCULAR; INTRAVENOUS at 20:59

## 2025-01-13 RX ADMIN — KETOROLAC TROMETHAMINE 30 MG: 30 INJECTION, SOLUTION INTRAMUSCULAR; INTRAVENOUS at 09:07

## 2025-01-13 RX ADMIN — DOCUSATE SODIUM 100 MG: 100 CAPSULE, LIQUID FILLED ORAL at 20:59

## 2025-01-13 RX ADMIN — OXYCODONE HYDROCHLORIDE AND ACETAMINOPHEN 2 TABLET: 5; 325 TABLET ORAL at 14:02

## 2025-01-13 RX ADMIN — SULFAMETHOXAZOLE AND TRIMETHOPRIM 1 TABLET: 800; 160 TABLET ORAL at 20:59

## 2025-01-13 RX ADMIN — MEROPENEM 2000 MG: 1 INJECTION, POWDER, FOR SOLUTION INTRAVENOUS at 16:14

## 2025-01-13 RX ADMIN — OXYCODONE HYDROCHLORIDE AND ACETAMINOPHEN 2 TABLET: 5; 325 TABLET ORAL at 04:18

## 2025-01-13 RX ADMIN — OXYCODONE HYDROCHLORIDE AND ACETAMINOPHEN 2 TABLET: 5; 325 TABLET ORAL at 00:01

## 2025-01-13 RX ADMIN — ONDANSETRON 4 MG: 4 TABLET, ORALLY DISINTEGRATING ORAL at 18:39

## 2025-01-13 RX ADMIN — MEROPENEM 2000 MG: 1 INJECTION, POWDER, FOR SOLUTION INTRAVENOUS at 09:28

## 2025-01-13 RX ADMIN — OXYCODONE HYDROCHLORIDE AND ACETAMINOPHEN 1 TABLET: 5; 325 TABLET ORAL at 18:38

## 2025-01-13 RX ADMIN — KETOROLAC TROMETHAMINE 30 MG: 30 INJECTION, SOLUTION INTRAMUSCULAR; INTRAVENOUS at 04:17

## 2025-01-13 RX ADMIN — KETOROLAC TROMETHAMINE 30 MG: 30 INJECTION, SOLUTION INTRAMUSCULAR; INTRAVENOUS at 16:14

## 2025-01-13 ASSESSMENT — PAIN SCALES - GENERAL
PAINLEVEL_OUTOF10: 6
PAINLEVEL_OUTOF10: 7
PAINLEVEL_OUTOF10: 7
PAINLEVEL_OUTOF10: 5
PAINLEVEL_OUTOF10: 7
PAINLEVEL_OUTOF10: 7
PAINLEVEL_OUTOF10: 5
PAINLEVEL_OUTOF10: 7

## 2025-01-13 ASSESSMENT — PAIN DESCRIPTION - LOCATION
LOCATION: ABDOMEN

## 2025-01-13 ASSESSMENT — PAIN - FUNCTIONAL ASSESSMENT
PAIN_FUNCTIONAL_ASSESSMENT: ACTIVITIES ARE NOT PREVENTED

## 2025-01-13 ASSESSMENT — PAIN DESCRIPTION - ORIENTATION
ORIENTATION: LOWER
ORIENTATION: LOWER
ORIENTATION: MID
ORIENTATION: MID

## 2025-01-13 ASSESSMENT — PAIN DESCRIPTION - DESCRIPTORS
DESCRIPTORS: ACHING
DESCRIPTORS: ACHING
DESCRIPTORS: CRAMPING
DESCRIPTORS: CRAMPING;DISCOMFORT

## 2025-01-13 NOTE — CONSULTS
Consult Note 25        NAME: Marissa Flores  : 1990  MRN: [id number]      Assessment/Plan:  Marissa Flores is a 34 y.o. female     Severe sepsis secondary to line infection (subsequently removed)  Acinetobacter bacteremia due to above  Status postdelivery   Mild pancytopenia postop (suspect secondary to sepsis)  Minimal elevation of liver function studies (alkaline phos, ALT)  Concurrent Coronaviral infection HKU1    Continue antimicrobial therapy as directed by infectious disease service, note provision of monotherapy currently (cefepime)  Monitor renal function, electrolytes, urine output  Monitor serial hemoglobin values/clinical bleeding, coagulation profile  DVT ulcer prophylaxis    Chief Complaint / Reason for Consult:    As above    History of Present Illness:    34-year-old female, admitted to the hospital with fever, chills ultimately noted bacteremia (Acinetobacter) secondary to line infection (PICC line provided in home setting during pregnancy (31 weeks, G7, P4) IV fluid administration given hyperemesis).  Fetal distress was noted during current hospital stay in addition to significant tachycardia desaturation (mother) hence the patient underwent an emergent .  Following , note persistent tachycardia (sinus) acceptable hemodynamics and acceptable saturation value 2 L flow rate nasal cannula route (100% via pulse oximetry)        ROS:    Review of Systems     As above    Past Medical, Surgical, Social, Family History:   Past Medical History:   Diagnosis Date    Atypical glandular cells on cervical Pap smear     H/O gastric sleeve     2023     Past Surgical History:   Procedure Laterality Date    CERVICAL BIOPSY  W/ LOOP ELECTRODE EXCISION      STOMACH SURGERY       Social History     Socioeconomic History    Marital status:      Spouse name: Not on file    Number of children: Not on file    Years of education: Not on file    Highest education level: 
    V2.0  [facility] Consult Note      Name:  Marissa Flores /Age/Sex: 1990  (34 y.o. female)   MRN & CSN:  [id number] & [id number] Encounter Date/Time: 2025 4:46 PM EST   Location:  ED11/ED-11 PCP: No primary care provider on file.     Attending:Priti Emerson DO  Consulting Provider: Rafael Benoit MD      Hospital Day: 1    Assessment and Recommendations   Marissa Flores is a 34 y.o. female who presents with Sepsis (HCC)    Hospital Problems             Last Modified POA    * (Principal) Sepsis (HCC) 2025 Yes    31 weeks gestation of pregnancy 2025 Yes    Tachycardia 2025 Yes       Recommendations:   Fevers/fatigue: Rapid flu and COVID-negative; will check viral PCR; continue broad spectrum antibiotics, lactic acid ordered and pending, check Pro-Pramod and CRP, did examine the PICC line area and does not appear erythematous or infectious, will follow culture results and monitor closely;UA also pending  History of GERD: Continue PPI  Pregnancy: Being admitted to the OB/GYN service      Diet ADULT DIET; Regular   DVT Prophylaxis [] Lovenox, []  Heparin, [] SCDs, [] Ambulation,  [] Eliquis, [] Xarelto   Code Status Full Code   Surrogate Decision Maker/ POA       Personally reviewed Lab Studies and Imaging     Discussed management of the case with ED provider who consulted medicine for assistance with infectious workup; OB/GYN will admit the patient to the OB/GYN unit given she is 31 weeks pregnant      History From:    History obtained from the patient.     History of Present Illness:      Chief Complaint: Fevers fatigue    Marissa Flores is a 34 y.o. female 31 weeks pregnant,, GERD, this presenting with fatigue and fevers chills rigors.  States she also has had a runny nose.  The symptoms were notably worse yesterday and this morning which is why the patient came to the emergency room.  Denies any cough or shortness of breath.  Has not noticed any erythema or skin changes around the area of her PICC 
Clinical Pharmacy Progress Note    Gentamicin has been discontinued. Pharmacy will sign off. Please re-consult pharmacy if gentamicin dosing is wanted in the future.    Please call with questions.  Yanci Collado, PharmD, RP, BCPS  Main Pharmacy: [id number]  1/13/2025 1:31 PM      
Clinical Pharmacy Progress Note    Vancomycin has been discontinued by . Pharmacy will sign off. Please re-consult pharmacy if Vancomycin  dosing is wanted in the future.    Please call with questions.    Warren Osorio, PharmD, [facility]   Main Pharmacy [id number]  1/8/2025 3:40 PM       
Consult completed.    Indication for line: Limited/no vessel suitable for conventional peripheral access  Medication/Anticipated Duration: ABX  Ordering Provider: Dr. Banerjee    LINE STATUS: READY TO USE MIDLINE    Midline insertion education provided to patient, risks and benefits discussed and reviewed with patient. Patient verbalized understanding, questions answered. Pressure injectable BARD PowerGlide Pro™ 18g 8 cm single lumen midline inserted into RUE basilic vein x 1 attempt using sterile ultrasound guided technique without difficulty per protocol. Brisk blood return noted and flushes without resistance. Patient tolerated procedure well. Ultrasound photos of vein diameter provided below. Primary nurse Lolly notified and aware.    Consult the Vascular Access Team for questions, concerns, or change in patient's condition.       
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  Met sepsis criteria  No imaging available  Micro data: 1 of 2 blood cx  + Acinetobacter calcoaceticus baumannii complex with no resistant gene. Resp Viral panel + coronavirus HKU 1.  UA without pyuria .  WBC 6.7, CRP 49. Procal 1.07, Tm 100.8F 1347 .  Elevated lactic acid.  Antimicrobial summary: IV vancomycin -; IV Cefepime -;    31-week 1 day gestation with hyperemesis gravidarum  Has home midwife for twice daily infusions of LR via PICC for hyperemesis  OB/GYN following  Prolonged Qtc  Allergy: No antibiotic allergy  GFR >90  QTc 510  Comorbid conditions: GERD, 31 weeks gestation, receives IV fluids via PICC line placed 10/20/2024 for hyperemesis, chronic anemia    Plan  Therapeutic: Continue IV Cefepime 2gm IV q8hrs, discontinue IV Vancomycin.  Anticipate 7-10 day antimicrobial course from negative blood cultures  Recommend removing left arm PICC line and culture tip  Diagnostic: Repeat blood cultures, trend Pro-Pramod, CRP, CBC  F/u: Blood culture susceptibility  Other:     Thank you for allowing me to consult in the care of this patient.  ------------------------  REASON FOR CONSULT: \"Infective syndrome\" \" Acinetobacter calcoaceticus baumannii bacteremia and pregnancy, patient has PICC  Requested by: Dr. Monae  HPI:Patient is a 34 y.o. female history of GERD chronic anemia currently pregnant at 31 weeks (via IVF) who was admitted 2025 for further evaluation and management of chills and congestion. The patient reports chills and fever a few days prior to presentation with Temp max 100.8F. She reported mild congestion. She has ongoing n/v with pregnancy and is followed by home midwife for twice daily infusions of 
PHARMACY AMINOGLYCOSIDE MONITORING SERVICE     Marissa Flores is a 34 y.o. female started on gentamicin for acinetobacter baumannii blood stream infection. Pharmacy consulted by Dr. Mtz for monitoring and adjustment.    Indication for treatment: Acinetobater baumannii bacteremia   Target peak: Peak:KALE of 10-12, KALE <2 therefore target peak 20-25 mg/L  Target trough: <0.5 mg/L    Other antimicrobials: meropenem    Ideal body weight: 57 kg (125 lb 10.6 oz)  Adjusted ideal body weight: 76.4 kg (168 lb 5.7 oz)      Pertinent Laboratory Values:   Recent Labs     25  0324 25  1320 25  0329 25  0920   WBC 4.6 3.2* 8.4  --    BUN 4* 5*  --  7   CREATININE 0.4* 0.5*  --  0.4*     EstCrCl: Estimated Creatinine Clearance: 236 mL/min (A) (based on SCr of 0.4 mg/dL (L)).  Urine Output:    Intake/Output Summary (Last 24 hours) at 2025 1159  Last data filed at 2025 1100  Gross per 24 hour   Intake 7166.35 ml   Output 3941 ml   Net 3225.35 ml       Pertinent Cultures:  Date    Source    Result     Blood    Acinetobacter baumannii (KALE <2)     Blood    Acinetobacter baumannii     Catheter tip   In process    Aminoglycoside levels:   No results for input(s): \"GENTPEAK\" in the last 72 hours. No results for input(s): \"GENTTROUGH\" in the last 72 hours.No results for input(s): \"GENTRANDOM\" in the last 72 hours.  No results for input(s): \"TOBRAPEAK\" in the last 72 hours.  No results for input(s): \"TOBRATROUGH\" in the last 72 hours. No results for input(s): \"TOBRARND\" in the last 72 hours.      Assessment/Plan:  · Pt is 34 yof started on gentamicin for double coverage with meropenem of acinetobacter baumannii bloodstream infection secondary to PICC line infection. Patient is POD1 emergent . Will plan to dose gentamicin 7mg/kg on AdjBW = 540mg. Due to critical illness and PK changes of pregnancy and recent surgery, will obtain two levels for PK calculations. Will collect first level 1 
occurrences  Day(s) of therapy: 1  Vancomycin concentration:   01/10 - to be collected    Plan:  Ordered vancomycin loading dose of 2500 mg IV x once, followed by vancomycin 1500 mg IV q12h  Regimen predicts ssAUC of 481 mg/L*hr  Pharmacokinetics altered in pregnancy. Will obtain frequent levels.  Pharmacy will continue to monitor patient and adjust therapy as indicated    VANCOMYCIN CONCENTRATION SCHEDULED FOR: 01/10 @ 0305    Thank you for the consult.  Dana Nicholson RPH  1/8/2025 1:46 PM

## 2025-01-14 VITALS
RESPIRATION RATE: 15 BRPM | DIASTOLIC BLOOD PRESSURE: 92 MMHG | WEIGHT: 253.53 LBS | OXYGEN SATURATION: 99 % | TEMPERATURE: 97.2 F | HEART RATE: 69 BPM | BODY MASS INDEX: 42.24 KG/M2 | SYSTOLIC BLOOD PRESSURE: 140 MMHG | HEIGHT: 65 IN

## 2025-01-14 LAB
ALBUMIN SERPL-MCNC: 2.5 G/DL (ref 3.4–5)
ALBUMIN/GLOB SERPL: 1.3 {RATIO} (ref 1.1–2.2)
ALP SERPL-CCNC: 335 U/L (ref 40–129)
ALT SERPL-CCNC: 38 U/L (ref 10–40)
ANION GAP SERPL CALCULATED.3IONS-SCNC: 6 MMOL/L (ref 9–17)
AST SERPL-CCNC: 44 U/L (ref 15–37)
BILIRUB SERPL-MCNC: 0.2 MG/DL (ref 0–1)
BUN SERPL-MCNC: 8 MG/DL (ref 7–20)
CALCIUM SERPL-MCNC: 8.5 MG/DL (ref 8.3–10.6)
CHLORIDE SERPL-SCNC: 103 MMOL/L (ref 99–110)
CO2 SERPL-SCNC: 28 MMOL/L (ref 21–32)
CREAT SERPL-MCNC: 0.7 MG/DL (ref 0.6–1.1)
ERYTHROCYTE [DISTWIDTH] IN BLOOD BY AUTOMATED COUNT: 14.7 % (ref 11.7–14.9)
GFR, ESTIMATED: >90 ML/MIN/1.73M2
GLUCOSE SERPL-MCNC: 75 MG/DL (ref 74–99)
HCT VFR BLD AUTO: 23.9 % (ref 37–47)
HGB BLD-MCNC: 7.3 G/DL (ref 12.5–16)
MCH RBC QN AUTO: 27.4 PG (ref 27–31)
MCHC RBC AUTO-ENTMCNC: 30.5 G/DL (ref 32–36)
MCV RBC AUTO: 89.8 FL (ref 78–100)
PLATELET # BLD AUTO: 218 K/UL (ref 140–440)
PMV BLD AUTO: 11.4 FL (ref 7.5–11.1)
POTASSIUM SERPL-SCNC: 4.1 MMOL/L (ref 3.5–5.1)
PROT SERPL-MCNC: 4.4 G/DL (ref 6.4–8.2)
RBC # BLD AUTO: 2.66 M/UL (ref 4.2–5.4)
SODIUM SERPL-SCNC: 137 MMOL/L (ref 136–145)
WBC OTHER # BLD: 8.1 K/UL (ref 4–10.5)

## 2025-01-14 PROCEDURE — 6370000000 HC RX 637 (ALT 250 FOR IP): Performed by: OBSTETRICS & GYNECOLOGY

## 2025-01-14 PROCEDURE — APPNB30 APP NON BILLABLE TIME 0-30 MINS: Performed by: ADVANCED PRACTICE MIDWIFE

## 2025-01-14 PROCEDURE — 6370000000 HC RX 637 (ALT 250 FOR IP): Performed by: STUDENT IN AN ORGANIZED HEALTH CARE EDUCATION/TRAINING PROGRAM

## 2025-01-14 PROCEDURE — 85027 COMPLETE CBC AUTOMATED: CPT

## 2025-01-14 PROCEDURE — 6360000002 HC RX W HCPCS: Performed by: OBSTETRICS & GYNECOLOGY

## 2025-01-14 PROCEDURE — 80053 COMPREHEN METABOLIC PANEL: CPT

## 2025-01-14 PROCEDURE — 6370000000 HC RX 637 (ALT 250 FOR IP): Performed by: NURSE PRACTITIONER

## 2025-01-14 RX ORDER — SULFAMETHOXAZOLE AND TRIMETHOPRIM 800; 160 MG/1; MG/1
1 TABLET ORAL EVERY 12 HOURS SCHEDULED
Qty: 26 TABLET | Refills: 0 | Status: SHIPPED | OUTPATIENT
Start: 2025-01-14 | End: 2025-01-27

## 2025-01-14 RX ADMIN — SULFAMETHOXAZOLE AND TRIMETHOPRIM 1 TABLET: 800; 160 TABLET ORAL at 09:06

## 2025-01-14 RX ADMIN — FERROUS SULFATE TAB 325 MG (65 MG ELEMENTAL FE) 325 MG: 325 (65 FE) TAB at 09:06

## 2025-01-14 RX ADMIN — KETOROLAC TROMETHAMINE 30 MG: 30 INJECTION, SOLUTION INTRAMUSCULAR; INTRAVENOUS at 09:07

## 2025-01-14 RX ADMIN — OXYCODONE HYDROCHLORIDE AND ACETAMINOPHEN 2 TABLET: 5; 325 TABLET ORAL at 04:19

## 2025-01-14 RX ADMIN — DOCUSATE SODIUM 100 MG: 100 CAPSULE, LIQUID FILLED ORAL at 09:06

## 2025-01-14 NOTE — PLAN OF CARE
Problem: Discharge Planning  Goal: Discharge to home or other facility with appropriate resources  1/11/2025 1044 by Kathryn Mclaughlin RN  Outcome: Progressing  1/11/2025 0027 by Joon Garvey RN  Outcome: Progressing     Problem: Pain  Goal: Verbalizes/displays adequate comfort level or baseline comfort level  1/11/2025 1044 by Kathryn Mclaughlin RN  Outcome: Progressing  1/11/2025 0027 by Joon Garvey RN  Outcome: Progressing     Problem: Cardiovascular - Adult  Goal: Maintains optimal cardiac output and hemodynamic stability  Description: INTERVENTIONS:.  -Monitor blood pressure and heart rate  -Monitor urine output and notify licensed practitioner for values outside of   -Administer fluid and /or volume expanders as ordered  -Administer vasoactive or antifibrinolytic medications as ordered  -Monitor labs and assess for signs and symptoms of volume excess or deficit  -Monitor response to interventions for patient's volume status, including labs, urine output  -Assess for signs and symptoms of bleeding or hemorrhage  -Monitor labs for bleeding or clotting disorders as ordered  Administer blood products/factors as ordered    1/11/2025 1044 by Katrhyn Mclaughlin RN  Outcome: Progressing  1/11/2025 0027 by Joon Garvey RN  Outcome: Progressing     Problem: Respiratory - Adult  Goal: Achieves optimal ventilation and oxygenation  Description: INTERVENTIONS:.  -Assess for changes in respiratory status   -Assess for changes in mentation and behavior  -Position to facilitate oxygenation and minimize respiratory effort  -Oxygen supplementation based on oxygen saturation or arterial blood gases  -Assess and instruct to report shortness of breath or any respiratory difficulty  -Respiratory therapy support as indicated      1/11/2025 1044 by Kathryn Mclaughlin RN  Outcome: Progressing  1/11/2025 0027 by Joon Garvey RN  Outcome: Progressing     Problem: Coping  Goal: Pt/Family able to verbalize concerns 
  Problem: Discharge Planning  Goal: Discharge to home or other facility with appropriate resources  1/11/2025 2259 by Joon Garvey RN  Outcome: Progressing  1/11/2025 1044 by Kathryn Mclaughlin RN  Outcome: Progressing     Problem: Pain  Goal: Verbalizes/displays adequate comfort level or baseline comfort level  1/11/2025 2259 by Joon Garvey RN  Outcome: Progressing  1/11/2025 1044 by Kathryn Mclaughlin RN  Outcome: Progressing     Problem: Cardiovascular - Adult  Goal: Maintains optimal cardiac output and hemodynamic stability  Description: INTERVENTIONS:.  -Monitor blood pressure and heart rate  -Monitor urine output and notify licensed practitioner for values outside of   -Administer fluid and /or volume expanders as ordered  -Administer vasoactive or antifibrinolytic medications as ordered  -Monitor labs and assess for signs and symptoms of volume excess or deficit  -Monitor response to interventions for patient's volume status, including labs, urine output  -Assess for signs and symptoms of bleeding or hemorrhage  -Monitor labs for bleeding or clotting disorders as ordered  Administer blood products/factors as ordered    1/11/2025 2259 by Joon Garvey RN  Outcome: Progressing  1/11/2025 1044 by Kathryn Mclaughlin RN  Outcome: Progressing     Problem: Respiratory - Adult  Goal: Achieves optimal ventilation and oxygenation  Description: INTERVENTIONS:.  -Assess for changes in respiratory status   -Assess for changes in mentation and behavior  -Position to facilitate oxygenation and minimize respiratory effort  -Oxygen supplementation based on oxygen saturation or arterial blood gases  -Assess and instruct to report shortness of breath or any respiratory difficulty  -Respiratory therapy support as indicated      1/11/2025 2259 by Joon Garvey RN  Outcome: Progressing  1/11/2025 1044 by Kathryn Mclaughlin RN  Outcome: Progressing     Problem: Coping  Goal: Pt/Family able to verbalize concerns 
  Problem: Discharge Planning  Goal: Discharge to home or other facility with appropriate resources  1/12/2025 1227 by Kathryn Mclaughlin RN  Outcome: Progressing  1/11/2025 2259 by Joon Garvey RN  Outcome: Progressing     Problem: Pain  Goal: Verbalizes/displays adequate comfort level or baseline comfort level  1/12/2025 1227 by Kathryn Mclaughlin RN  Outcome: Progressing  1/11/2025 2259 by Joon Garvey RN  Outcome: Progressing     Problem: Cardiovascular - Adult  Goal: Maintains optimal cardiac output and hemodynamic stability  Description: INTERVENTIONS:.  -Monitor blood pressure and heart rate  -Monitor urine output and notify licensed practitioner for values outside of   -Administer fluid and /or volume expanders as ordered  -Administer vasoactive or antifibrinolytic medications as ordered  -Monitor labs and assess for signs and symptoms of volume excess or deficit  -Monitor response to interventions for patient's volume status, including labs, urine output  -Assess for signs and symptoms of bleeding or hemorrhage  -Monitor labs for bleeding or clotting disorders as ordered  Administer blood products/factors as ordered    1/12/2025 1227 by Kathryn Mclaughlin RN  Outcome: Progressing  1/11/2025 2259 by Joon Garvey RN  Outcome: Progressing     Problem: Respiratory - Adult  Goal: Achieves optimal ventilation and oxygenation  Description: INTERVENTIONS:.  -Assess for changes in respiratory status   -Assess for changes in mentation and behavior  -Position to facilitate oxygenation and minimize respiratory effort  -Oxygen supplementation based on oxygen saturation or arterial blood gases  -Assess and instruct to report shortness of breath or any respiratory difficulty  -Respiratory therapy support as indicated      1/12/2025 1227 by Kathryn Mclaughlin RN  Outcome: Progressing  1/11/2025 2259 by Joon Garvey RN  Outcome: Progressing     Problem: Coping  Goal: Pt/Family able to verbalize concerns 
  Problem: Discharge Planning  Goal: Discharge to home or other facility with appropriate resources  1/14/2025 0854 by Emily Dunn RN  Outcome: Completed  1/13/2025 1947 by Sinai Kaufman RN  Outcome: Progressing     Problem: Pain  Goal: Verbalizes/displays adequate comfort level or baseline comfort level  1/14/2025 0854 by Emily Dunn RN  Outcome: Completed  1/13/2025 1947 by Sinai Kaufman RN  Outcome: Progressing  Flowsheets (Taken 1/13/2025 1344 by Opal Hernandez, RN)  Verbalizes/displays adequate comfort level or baseline comfort level:   Encourage patient to monitor pain and request assistance   Assess pain using appropriate pain scale     Problem: Cardiovascular - Adult  Goal: Maintains optimal cardiac output and hemodynamic stability  Description: INTERVENTIONS:.  -Monitor blood pressure and heart rate  -Monitor urine output and notify licensed practitioner for values outside of   -Administer fluid and /or volume expanders as ordered  -Administer vasoactive or antifibrinolytic medications as ordered  -Monitor labs and assess for signs and symptoms of volume excess or deficit  -Monitor response to interventions for patient's volume status, including labs, urine output  -Assess for signs and symptoms of bleeding or hemorrhage  -Monitor labs for bleeding or clotting disorders as ordered  Administer blood products/factors as ordered    1/14/2025 0854 by Emily Dunn RN  Outcome: Completed  1/13/2025 1947 by Sinai Kaufman RN  Outcome: Progressing     Problem: Respiratory - Adult  Goal: Achieves optimal ventilation and oxygenation  Description: INTERVENTIONS:.  -Assess for changes in respiratory status   -Assess for changes in mentation and behavior  -Position to facilitate oxygenation and minimize respiratory effort  -Oxygen supplementation based on oxygen saturation or arterial blood gases  -Assess and instruct to report shortness of breath or any respiratory difficulty  -Respiratory therapy 
  Problem: Discharge Planning  Goal: Discharge to home or other facility with appropriate resources  1/8/2025 1531 by Sade Naidu RN  Outcome: Progressing  1/8/2025 1228 by Catie Hoffman RN  Outcome: Progressing  Flowsheets (Taken 1/8/2025 0876)  Discharge to home or other facility with appropriate resources:   Identify barriers to discharge with patient and caregiver   Arrange for needed discharge resources and transportation as appropriate   Identify discharge learning needs (meds, wound care, etc)   Arrange for interpreters to assist at discharge as needed   Refer to discharge planning if patient needs post-hospital services based on physician order or complex needs related to functional status, cognitive ability or social support system     Problem: Pain  Goal: Verbalizes/displays adequate comfort level or baseline comfort level  1/8/2025 1531 by Sade Naidu RN  Outcome: Progressing  1/8/2025 1228 by Catie Hoffman RN  Outcome: Progressing     Problem: Cardiovascular - Adult  Goal: Maintains optimal cardiac output and hemodynamic stability  Description: INTERVENTIONS:.  -Monitor blood pressure and heart rate  -Monitor urine output and notify licensed practitioner for values outside of   -Administer fluid and /or volume expanders as ordered  -Administer vasoactive or antifibrinolytic medications as ordered  -Monitor labs and assess for signs and symptoms of volume excess or deficit  -Monitor response to interventions for patient's volume status, including labs, urine output  -Assess for signs and symptoms of bleeding or hemorrhage  -Monitor labs for bleeding or clotting disorders as ordered  Administer blood products/factors as ordered    Outcome: Progressing     Problem: Respiratory - Adult  Goal: Achieves optimal ventilation and oxygenation  Description: INTERVENTIONS:.  -Assess for changes in respiratory status   -Assess for changes in mentation and behavior  -Position to facilitate oxygenation 
  Problem: Discharge Planning  Goal: Discharge to home or other facility with appropriate resources  Outcome: Progressing     Problem: Pain  Goal: Verbalizes/displays adequate comfort level or baseline comfort level  Outcome: Progressing  Flowsheets (Taken 1/13/2025 1344 by Opal Hernandez RN)  Verbalizes/displays adequate comfort level or baseline comfort level:   Encourage patient to monitor pain and request assistance   Assess pain using appropriate pain scale     Problem: Cardiovascular - Adult  Goal: Maintains optimal cardiac output and hemodynamic stability  Description: INTERVENTIONS:.  -Monitor blood pressure and heart rate  -Monitor urine output and notify licensed practitioner for values outside of   -Administer fluid and /or volume expanders as ordered  -Administer vasoactive or antifibrinolytic medications as ordered  -Monitor labs and assess for signs and symptoms of volume excess or deficit  -Monitor response to interventions for patient's volume status, including labs, urine output  -Assess for signs and symptoms of bleeding or hemorrhage  -Monitor labs for bleeding or clotting disorders as ordered  Administer blood products/factors as ordered    Outcome: Progressing     Problem: Respiratory - Adult  Goal: Achieves optimal ventilation and oxygenation  Description: INTERVENTIONS:.  -Assess for changes in respiratory status   -Assess for changes in mentation and behavior  -Position to facilitate oxygenation and minimize respiratory effort  -Oxygen supplementation based on oxygen saturation or arterial blood gases  -Assess and instruct to report shortness of breath or any respiratory difficulty  -Respiratory therapy support as indicated      Outcome: Progressing     Problem: Coping  Goal: Pt/Family able to verbalize concerns and demonstrate effective coping strategies  Description: INTERVENTIONS:  1. Assist patient/family to identify coping skills, available support systems and cultural and spiritual 
  Problem: Discharge Planning  Goal: Discharge to home or other facility with appropriate resources  Outcome: Progressing  Flowsheets (Taken 1/7/2025 7530)  Discharge to home or other facility with appropriate resources:   Identify barriers to discharge with patient and caregiver   Arrange for needed discharge resources and transportation as appropriate   Identify discharge learning needs (meds, wound care, etc)   Arrange for interpreters to assist at discharge as needed   Refer to discharge planning if patient needs post-hospital services based on physician order or complex needs related to functional status, cognitive ability or social support system     Problem: Pain  Goal: Verbalizes/displays adequate comfort level or baseline comfort level  Outcome: Progressing     
  Problem: Discharge Planning  Goal: Discharge to home or other facility with appropriate resources  Outcome: Progressing  Flowsheets (Taken 1/8/2025 2656)  Discharge to home or other facility with appropriate resources:   Identify barriers to discharge with patient and caregiver   Arrange for needed discharge resources and transportation as appropriate   Identify discharge learning needs (meds, wound care, etc)   Arrange for interpreters to assist at discharge as needed   Refer to discharge planning if patient needs post-hospital services based on physician order or complex needs related to functional status, cognitive ability or social support system     Problem: Pain  Goal: Verbalizes/displays adequate comfort level or baseline comfort level  Outcome: Progressing     
appropriate  5. Assess for spiritual pain/suffering and initiate Spiritual Care, Psychosocial Clinical Specialist consults as needed  Outcome: Progressing

## 2025-01-14 NOTE — LACTATION NOTE
Mother sitting up in the chair. Mother states she will be discharged this morning. Mother states she is continuing to pump. Mother states she does have her electric breast pump. Encouraged her to take the breast feeding parts of hospital pump as well as her own to [facility] to see what pump they may have for her to use. Mother verbalizes understanding.     Expressed breast milk that was in the breast milk only refrigerator has been given to mother.

## 2025-01-14 NOTE — DISCHARGE INSTRUCTIONS
Follow up on Thursday 1/16/25 for a blood pressure check    Special Stains Stage 1 - Results: Base On Clearance Noted Above

## 2025-01-14 NOTE — PROGRESS NOTES
Inpatient Critical care Progress Note 2025        Marissa Flores  1990  [id number]      Assessment/Plan:    Marissa Flores is a 34 y.o. female      Severe sepsis secondary to line infection (subsequently removed)  Acinetobacter bacteremia due to above  Status postdelivery   Mild pancytopenia postop (suspect secondary to sepsis)  Minimal elevation of liver function studies (alkaline phos, ALT)  Concurrent Corona viral infection HKU1     Adjust antimicrobial therapy based on sensitivities of Acinetobacter (antimicrobial transition to meropenem and minocycline)  Monitor renal function, electrolytes, urine output  Monitor serial hemoglobin values/clinical bleeding, coagulation profile  Monitor vaginal bleeding  Transfusion threshold approximately 7 g/dL  DVT ulcer prophylaxis      Complex decisions required for evaluation and management reviewed during critical care rounds      Based on the patient's current clinical status, she may transfer out of critical care unit setting assuming this is acceptable to the primary service.      [facility]  [phone]      Subjective:    No new acute overnight events reported.  Patient remains persistently tachycardic although heart rate lower versus admission.  Acceptable hemodynamics.    Low-grade fever reported overnight    Overall, the patient notes an improvement in her sense of wellbeing.    Intermittent of passage of blood vaginally      Review of Systems   Constitutional:  Positive for fatigue.   HENT: Negative.     Eyes: Negative.    Respiratory: Negative.     Cardiovascular: Negative.    Gastrointestinal:  Positive for abdominal pain.   Endocrine: Negative.    Genitourinary:  Positive for vaginal bleeding.   Musculoskeletal: Negative.    Skin: Negative.    Allergic/Immunologic: Negative.    Neurological: Negative.    Hematological: Negative.    Psychiatric/Behavioral: Negative.              Physical Exam:           /89   Pulse 81   Temp 97.1 °F 
    V2.0  [facility] [place] [facility] Note      Name:  Marissa Flores /Age/Sex: 1990  (34 y.o. female)   MRN & CSN:  [id number] & [id number] Encounter Date/Time: 2025 7:15 AM EST    Location:  [address] [place] [address]-A PCP: No primary care provider on file.       Hospital Day: 3    Assessment and Plan:   Marissa Flores is a 34 y.o. female  who presents with Sepsis (HCC)    #Severe sepsis 2/2 Acinetobacter bacteremia  #Coronavirus HKU1 infection  -presented with fevers, chills, fatigue and runny nose  -SIRS criteria: T: 100.8, Tachycardia, Tachypnea  -Hypotensive to as low as 82/40 recorded  -patient has PICC in place for BID LR infusions, per OBGYN note, it had been replaced once during pregnancy due to concern for infection  -PICC line area examined on admission and does not appear erythematous or infectious  -Rapid flu and COVID-negative  -Elevated procal to 1.06, CRP 49  -Blood cultures  positive for acinetobacter calcoaceticus baumannii  -Blood cultures  positive for GNR  -UA without signs of infection  -initial lactate was normal however it was 2.5 on repeat  -RVP positive for coronavirus HKU1  - inflammatory markers uptrending  -PICC line removed on , sent for culturing  Continue cefepime and vancomycin  Follow up on final culture results  Recommend ID consult due to bacteremia  ID recommending cefepime 2 g IV every 8 hours, discontinue IV vancomycin, anticipate 7 to 10-day course  Agree with ID, recommend removing left arm PICC line and culture tip  Patient with cough, left lower lobe crackles, shielded chest x-ray ordered (cleared with OB/GYN before ordering this)  Repeat blood culture  Patient transferred to ICU  Follow-up on final culture results    # Emergency    # Fetal decels    #Acute on chronic anemia  -Hb 9.3, previously 12,9-11.6 (unknown whether a drop in Hb is normal in pregnancy?)  Continue to monitor, transfuse if less than 7  Consider anemia workup if it continues trending 
    V2.0  [facility] [place]      Name:  Marissa Flores /Age/Sex: 1990  (34 y.o. female)   MRN & CSN:  [id number] & [id number] Encounter Date/Time: 2025 7:15 AM EST    [facility]: No primary care provider on file.       Hospital Day: 2    Assessment and Plan:   Marissa Flores is a 34 y.o. female  who presents with Sepsis (HCC)    #Severe sepsis 2/2 Acinetobacter bacteremia  #Coronavirus HKU1 infection  -presented with fevers, chills, fatigue and runny nose  -SIRS criteria: T: 100.8, Tachycardia, Tachypnea  -Hypotensive to as low as 82/40 recorded  -patient has PICC in place for BID LR infusions, per OBGYN note, it had been replaced once during pregnancy due to concern for infection  -PICC line area examined on admission and does not appear erythematous or infectious  -Rapid flu and COVID-negative  -Elevated procal to 1.06, CRP 49  -Blood cultures 1/6 positive for acinetobacter calcoaceticus baumannii  -UA without signs of infection  -initial lactate was normal however it was 2.5 on repeat  -RVP positive for coronavirus HKU1  Continue cefepime and vancomycin  Follow up on final culture results  Recommend ID consult due to bacteremia  ID recommending cefepime 2 g IV every 8 hours, continue IV vancomycin, anticipate 7 to 10-day course  Agree with ID, recommend removing left arm PICC line and culture tip  Patient with cough, left lower lobe crackles, shielded chest x-ray ordered (cleared with OB/GYN before ordering this)  Repeat blood culture    #Acute on chronic anemia  -Hb 9.3, previously 12,9-11.6 (unknown whether a drop in Hb is normal in pregnancy?)  Continue to monitor, transfuse if less than 7  Consider anemia workup if it continues trending down    #GERD  Continue PPI    #Pregnancy  -31 weeks  -follows at home with midwife for home birth  admitted to the OB/GYN service      Diet ADULT DIET; Regular   DVT Prophylaxis [] Lovenox, []  Heparin, [] SCDs, [x] Ambulation,  [] 
    V2.0  [facility] [place] Hospitalist consult progress Note      Name:  Marissa Flores /Age/Sex: 1990  (34 y.o. female)   MRN & CSN:  [id number] & [id number] Encounter Date/Time: 1/10/2025 7:15 AM EST    Location:  -A PCP: No primary care provider on file.       Hospital Day: 5    Assessment and Plan:   Marissa Flores is a 34 y.o. female  who presents with Sepsis (HCC)    #Severe sepsis 2/2 Acinetobacter bacteremia  #Coronavirus HKU1 infection  -presented with fevers, chills, fatigue and runny nose  -SIRS criteria: T: 100.8, Tachycardia, Tachypnea  -Hypotensive to as low as 82/40 recorded  -patient has PICC in place for BID LR infusions, per OBGYN note, it had been replaced once during pregnancy due to concern for infection  -PICC line area examined on admission and does not appear erythematous or infectious  -Rapid flu and COVID-negative  -Elevated procal to 1.06, CRP 49  -Blood cultures  positive for acinetobacter calcoaceticus baumannii  -Blood cultures  positive for GNR  -UA without signs of infection  -initial lactate was normal however it was 2.5 on repeat  -RVP positive for coronavirus HKU1  - inflammatory markers uptrending  -PICC line removed on , sent for culturing  Continue cefepime and vancomycin  Follow up on final culture results  ID consulted recommending cefepime 2 g IV every 8 hours, discontinue IV vancomycin, anticipate 7 to 10-day course  Agree with ID, recommend removing left arm PICC line and culture tip  Patient with cough, left lower lobe crackles, shielded chest x-ray ordered (cleared with OB/GYN before ordering this)  Repeat blood culture also positive from   Patient transferred to ICU on   Antibiotics as per ID, currently on Merrem and gentamicin repeat blood cultures until negative every 48 hours        # Emergency    # Fetal decels    #Acute on chronic anemia  -Hb 9.3, previously 12,9-11.6 likely from IntraOp blood loss  Continue to monitor, transfuse if 
  Physician Progress Note      PATIENT:               CYNDI APARICIO  CSN #:                  [id number]  :                       1990  ADMIT DATE:       2025 2:13 PM  DISCH DATE:  RESPONDING  PROVIDER #:        Sasha Monae MD          QUERY TEXT:    Pt admitted with sepsis.  Noted documentation of \"   Acinetobacter   calcoaceticus baumannii bacteremia in pregnancy, probable CLBSI\" by ID .    If possible, please document in progress notes and discharge summary:    The medical record reflects the following:  Risk Factors: PICC line  Clinical Indicators:  ID -  \"-        Sepsis  -        Acinetobacter calcoaceticus baumannii bacteremia in pregnancy,   probable CLBSI  -        Coronavirus HKU 1 infection  o        Patient presented for further management of chills and congestion.    Met sepsis criteria  o        No imaging available  o        Micro data: 1 of 2 blood cx  + Acinetobacter calcoaceticus   baumannii complex with no resistant gene. Resp Viral panel + coronavirus HKU   1.  -        31-week 1 day gestation with hyperemesis gravidarum  o        Has home midwife for twice daily infusions of LR via PICC for   hyperemesis\"  Treatment: ID consult, vancomycin, cefepime, BCx, serial labs, and supportive   care    Thank you,  Carol Sneed, RN, BSN, HONORIO, CCDS  Options provided:  -- CLABSI confirmed present on admission  -- CLABSI ruled out  -- Other - I will add my own diagnosis  -- Disagree - Not applicable / Not valid  -- Disagree - Clinically unable to determine / Unknown  -- Refer to Clinical Documentation Reviewer    PROVIDER RESPONSE TEXT:    The diagnosis of CLABSI was confirmed as present on admission.    Query created by: Carol Sneed on 2025 3:59 PM      Electronically signed by:  Sasha Monae MD 2025 4:12 PM          
4 Eyes Skin Assessment     NAME:  Marissa Flores  YOB: 1990  MEDICAL RECORD NUMBER:  [id number]    The patient is being assessed for  Admission    I agree that at least one RN has performed a thorough Head to Toe Skin Assessment on the patient. ALL assessment sites listed below have been assessed.      Areas assessed by both nurses:    Head, Face, Ears, Shoulders, Back, Chest, Arms, Elbows, Hands, Sacrum. Buttock, Coccyx, Ischium, and Legs. Feet and Heels        Does the Patient have a Wound? No noted wound(s)       Tylor Prevention initiated by RN: No  Wound Care Orders initiated by RN: No    Pressure Injury (Stage 3,4, Unstageable, DTI, NWPT, and Complex wounds) if present, place Wound referral order by RN under : No    New Ostomies, if present place, Ostomy referral order under : No     Nurse 1 eSignature: Electronically signed by Priti Mann RN on 1/7/25 at 3:27 AM EST    **SHARE this note so that the co-signing nurse can place an eSignature**    Nurse 2 eSignature: Electronically signed by Seema Lynn RN on 1/7/25 at 5:19 AM EST   
4 Eyes Skin Assessment     NAME:  Marissa Flores  YOB: 1990  MEDICAL RECORD NUMBER:  [id number]    The patient is being assessed for  Admission    I agree that at least one RN has performed a thorough Head to Toe Skin Assessment on the patient. ALL assessment sites listed below have been assessed.      Areas assessed by both nurses:    Head, Face, Ears, Shoulders, Back, Chest, Arms, Elbows, Hands, Sacrum. Buttock, Coccyx, Ischium, Legs. Feet and Heels, and Under Medical Devices         Does the Patient have a Wound? Yes wound(s) were present on assessment. LDA wound assessment was Initiated and completed by RN       Tylor Prevention initiated by RN: No  Wound Care Orders initiated by RN: No    Pressure Injury (Stage 3,4, Unstageable, DTI, NWPT, and Complex wounds) if present, place Wound referral order by RN under : No    New Ostomies, if present place, Ostomy referral order under : No     Nurse 1 eSignature: Electronically signed by MARISSA VALE RN on 1/8/25 at 5:44 PM EST    **SHARE this note so that the co-signing nurse can place an eSignature**    Nurse 2 eSignature: Electronically signed by Maryann Alcantar RN on 1/9/25 at 4:26 AM EST   
AVS reviewed. Precautions discussed and all questions addressed. Pt understands when to follow up and signs and symptoms to look out for. Pt has AVS. Pt ambulated to pharmacy to  medication, RN assisted pt to car for discharge. Pt tolerated well and stated \"I am so excited to go see my baby.\"     
Called L&D and spoke w charge RN, they are to send a nurse to do fetal nonstress soon. Inquired with charge about when OB physician would be rounding on pt, this RN was told OBGYN was in a delivery and would be around to see pt after. Pt updated.   
Dr. Banerjee to family waiting room to speak with the patients spouse and family. All questions answered.   
Dr. Corona notified of blood pressures on the lower side with SBP in 80s and MAP 50s. Notified that patient has been sleeping. Orders placed for 1L fluid bolus. Critical Hgb resulted at 5.5. Dr. Corona notified. Orders placed for 1 unit PRBC.    Dr. Zhou messaged and notified of 3L fluid bolus received, and critical hgb now going to receive blood transfusion.    Electronically signed by Maryann Alcantar RN on 1/9/2025 at 4:26 AM    
GYN progress note-postoperative day #1    Patient was seen this morning at 9 AM and this evening at 8 PM.  Both times he appeared to be resting comfortably alert and oriented.  Asking appropriate questions.  States that she is feeling uncomfortable this evening.  We discussed general anesthesia with no Duramorph assisting with her pain control.  Afebrile with stable vital signs.  Cultures were reviewed.  Abdominal dressing dry.    Postoperative day #1 status post  section for nonreassuring fetal heart tones at 31 weeks sepsis.    From surgical point of view she appears to be recovering well.  Status post 1 unit packed red blood cells.  Her starting hemoglobin was 7.7 and went to 5.5 postoperatively.  This was to be expected secondary to her blood loss from surgery.  She received blood as noted above.  Postoperative hemoglobin was 6.4.  
I have reviewed the patient's chart and H/P and first assisted on this patient's  delivery.     DIANE Fairbanks CNM  
Infectious Disease Progress Note  2025   Patient Name: Marissa Flores : 1990     Assessment  Sepsis  Acinetobacter calcoaceticus baumannii bacteremia in pregnancy, probable CLABSI  Coronavirus HKU 1 infection  Patient presented for further management of chills and congestion.  Met sepsis criteria. Consulted by Dr Monae to eval bacteremia.  S/p PICC line removal  CXR showing minimal right basilar airspace opacity favoring subsegmental atelectasis otherwise clear, non acute.  Micro data: PICC tip cx , suscp pending. Repeat blood cx  +Acinetobacter calcoaceticus baumannii, 1 of 2. 1 of 2 blood cx  + Acinetobacter calcoaceticus baumannii complex with no resistant gene (sens to fluoroquin, gent, tobra, meropenem, bactrim, I-unasyn). Resp Viral panel + coronavirus HKU 1.  UA without pyuria .  WBC 8.4, <--49. Procal 6<--1.07, Tm 101.9F 1347 .   Antimicrobial summary: IV vancomycin -, ; IV Cefepime -; IV Ampicillin-sulbactam -; Minocycline oral -     31-week 1 day gestation with hyperemesis gravidarum, s/p emergent  25  OB/GYN following    Acute blood loss anemia  Receiving PRBC's transfusion, Management per Primary team  Allergy: No antibiotic allergy  GFR >90  QTc 468  Comorbid conditions: GERD, 31 weeks gestation, receives IV fluids via PICC line placed 10/20/2024 for hyperemesis, chronic anemia     Plan  Therapeutic: Agree with discontinuing IV Unasyn and minocycline and starting IV Merrem 2gm q8hrs  Pharmacy consulted for IV Gentamicin  Anticipate 10 day antimicrobial course from negative blood cultures  Diagnostic: Repeat blood cx in am, and q48hrs until negative, trend Pro-Pramod, CRP, CBC  F/u: PICC line tip cx   Other:       Reason for visit: F/u acinetobacter calcoaceticus baumannii bacteremia  History:?Interval history noted  Denies n/v/d/f or untoward effects of antimicrobials  Feels fatigued  Physical Exam:  Vital Signs: BP (!) 104/58   Pulse 80   
Infectious Disease Progress Note  2025   Patient Name: Marissa Flores : 1990     Assessment  Sepsis  Acinetobacter calcoaceticus baumannii bacteremia in pregnancy, probable CLABSI  Coronavirus HKU 1 infection  Patient presented for further management of chills and congestion.  Met sepsis criteria. Consulted by Dr Monae to eval bacteremia.  S/p PICC line removal  CXR showing minimal right basilar airspace opacity favoring subsegmental atelectasis otherwise clear, non acute.  Micro data: Repeat blood cx 1/10 NGTD. PICC tip cx , >100k Acinetobacter nosocomialis (r-cefepime, I-Ceftriaxone), >100k cfu staph epi. (called lab-micro to fax). Repeat blood cx  +Acinetobacter calcoaceticus baumannii, 2 of 2. 1 of 2 blood cx  + Acinetobacter calcoaceticus baumannii complex with no resistant gene (sens to fluoroquin, gent, tobra, meropenem, bactrim, I-unasyn); stenotrophomonas (res cefepime, I-unasyn and ceftriaxone). Resp Viral panel + coronavirus HKU 1.  Repeat RVP negative .  UA without pyuria .  WBC 6.8, CRP 21.6<--124<--49. Procal .53<--6<--1.07, Tm 101.9F 1231 .   Antimicrobial summary: IV vancomycin -, ; IV Cefepime -; IV Ampicillin-sulbactam -; Minocycline oral -;. IV Merrem -; IV Gentamicin -     31-week 1 day gestation with hyperemesis gravidarum, s/p emergent  25  OB/GYN following    Acute blood loss anemia  Received PRBC's transfusion, Management per Primary team  Allergy: No antibiotic allergy  GFR >90  QTc 468  Comorbid conditions: GERD, 31 weeks gestation, receives IV fluids via PICC line placed 10/20/2024 for hyperemesis, chronic anemia     Plan  Therapeutic: Okay to discharge from ID standpoint when medically ready  Discontinue gentamicin.   Cont IV Merrem 2gm q8hrs and Bactrim DS 1 tab twice daily x 14 days, EOT 25  Recommending discarding breastmilk x 21 days  Diagnostic: trend Pro-Pramod, CRP, CBC  After discharge the following 
L&D nurse to come perform fetal nonstress on pt later this AM.   
Marta METCALF notified of pt BP, okay to discharge and follow up on Thursday for a BP recheck. Pt has BP cuff at home and knows how to use.   
Nurse was called to bedside from someone yelling out in hallway. This RN and FAUSTO Parish responded. Pt crying out in pain, restless in bed, pt cyanotic around lips and mouth. Pt complaining she \"hurts everywhere\", c/o chills, and saying she couldn't breathe. Placed pt on nasal cannula then nonrebreather while attempting to get an O2 sat and initiating RRT. Pt HR 160s. Pt placed on fetal monitor by L&D staff, labs drawn at bedside, and pt taken to L&D. This RN escorted pt over, pt  escorted by . Belongings and chart sent with patient.   
Outpatient Pharmacy Progress Note for Meds-to-Beds    Total number of Prescriptions Filled: 5    Additional Documentation:  Patient picked-up the medication(s) in the OP Pharmacy      Thank you for letting us serve your patients.  [address] [address]    Phone: [phone]    Fax: [phone]        
PHARMACY AMINOGLYCOSIDE MONITORING SERVICE     Marissa Flores is a 34 y.o. female started on gentamicin for acinetobacter baumannii blood stream infection. Pharmacy consulted by Dr. Mtz for monitoring and adjustment.    Indication for treatment: Acinetobater baumannii bacteremia   Target peak: Peak:KALE ratio of 8-12, KALE <2 therefore target peak 18-25 mg/L, ideally 20  Target trough: <0.5 mg/L    Other antimicrobials: meropenem    Ideal body weight: 57 kg (125 lb 10.6 oz)  Adjusted ideal body weight: 76.4 kg (168 lb 5.7 oz)    Current weight: 110kg (per RN who weighed pt 1/10 @ 1150)  Weight 1/09: 105.4kg    Pertinent Laboratory Values:   Recent Labs     01/08/25  0324 01/08/25  1320 01/09/25  0329 01/09/25  0920 01/10/25  0945   WBC 4.6 3.2* 8.4  --   --    BUN 4* 5*  --  7 13   CREATININE 0.4* 0.5*  --  0.4* 0.6     EstCrCl: Estimated Creatinine Clearance: 162 mL/min (based on SCr of 0.6 mg/dL).  Urine Output:    Intake/Output Summary (Last 24 hours) at 1/10/2025 1150  Last data filed at 1/10/2025 0005  Gross per 24 hour   Intake 2471.42 ml   Output 375 ml   Net 2096.42 ml       Pertinent Cultures:  Date    Source    Result  1/6   Blood    Acinetobacter baumannii (KALE <2)  1/7   Blood    Acinetobacter baumannii  1/8   Catheter tip   In process    Aminoglycoside levels:   No results for input(s): \"GENTPEAK\" in the last 72 hours. No results for input(s): \"GENTTROUGH\" in the last 72 hours.  Recent Labs     01/09/25  1525 01/09/25  2110   GENTRANDOM 11.5* 4.3*       Pharmacokinetics: drawn after 540 mg dose hung on 01/09/2025 at 1325  First serum concentration: 11.5 mg/L on 1/9 at 1525  Second serum concentration: 4.3 mg/L on 1/9 at 2110  Elimination rate constant (ke): 0.163 1/hr  Half-life (t1/2): 4.25 hours  Calculated peak concentration: 13.6 mg/L  Time to undetectable: 22 hours  Volume of distribution (Vd): 40 L (0.4 L/kg)      Assessment/Plan:  Pt is 34 yof started on gentamicin for double coverage with 
PHARMACY AMINOGLYCOSIDE MONITORING SERVICE     Marissa Flores is a 34 y.o. female started on gentamicin for acinetobacter baumannii blood stream infection. Pharmacy consulted by Dr. Mtz for monitoring and adjustment.    Indication for treatment: Acinetobater baumannii bacteremia   Target peak: Peak:KALE ratio of 8-12, KALE <2 therefore target peak 18-25 mg/L, ideally 20  Target trough: <0.5 mg/L    Other antimicrobials: meropenem    Ideal body weight: 57 kg (125 lb 10.6 oz)  Adjusted ideal body weight: 77 kg (169 lb 11.5 oz)    Current weight: 107 kg  Weight: 1/11: 108.4 kg  Weight 1/10: 110kg (per RN who weighed pt 1/10 @ 1150)  Weight 1/09: 105.4kg    Pertinent Laboratory Values:   Recent Labs     01/10/25  0945 01/11/25  0521 01/12/25  0902   WBC 6.4 5.3 7.9   BUN 13  --  9   CREATININE 0.6  --  0.5*     EstCrCl: Estimated Creatinine Clearance: 196 mL/min (A) (based on SCr of 0.5 mg/dL (L)).  Urine Output:  No intake or output data in the 24 hours ending 01/12/25 1112      Pertinent Cultures:  Date    Source    Result  1/6   Blood    Acinetobacter baumannii (KALE <2)  1/7   Blood    Acinetobacter baumannii  1/8   Catheter tip   Acinetobacter    Aminoglycoside levels:   No results for input(s): \"GENTPEAK\" in the last 72 hours. No results for input(s): \"GENTTROUGH\" in the last 72 hours.  Recent Labs     01/09/25  2110 01/10/25  1507 01/10/25  2300   GENTRANDOM 4.3* 23.7* 5.6 UG/ML       Pharmacokinetics: drawn after 540 mg dose hung on 01/09/2025 at 1325  First serum concentration: 11.5 mg/L on 1/9 at 1525  Second serum concentration: 4.3 mg/L on 1/9 at 2110  Elimination rate constant (ke): 0.163 1/hr  Half-life (t1/2): 4.25 hours  Calculated peak concentration: 13.6 mg/L  Time to undetectable: 22 hours  Volume of distribution (Vd): 40 L (0.4 L/kg)    Wt Readings from Last 3 Encounters:   01/12/25 107 kg (235 lb 12.8 oz)   11/05/24 99.5 kg (219 lb 6.4 oz)   09/03/24 96.2 kg (212 lb)     Assessment/Plan:  Pt is 34 
PHARMACY AMINOGLYCOSIDE MONITORING SERVICE     Marissa Flores is a 34 y.o. female started on gentamicin for acinetobacter baumannii blood stream infection. Pharmacy consulted by Dr. Mtz for monitoring and adjustment.    Indication for treatment: Acinetobater baumannii bacteremia   Target peak: Peak:KALE ratio of 8-12, KALE <2 therefore target peak 18-25 mg/L, ideally 20  Target trough: <0.5 mg/L    Other antimicrobials: meropenem    Ideal body weight: 57 kg (125 lb 10.6 oz)  Adjusted ideal body weight: 77.6 kg (171 lb)    Current weight: 108.4 kg  Weight 1/10: 110kg (per RN who weighed pt 1/10 @ 1150)  Weight 1/09: 105.4kg    Pertinent Laboratory Values:   Recent Labs     01/09/25  0329 01/09/25  0920 01/10/25  0945 01/11/25  0521   WBC 8.4  --  6.4 5.3   BUN  --  7 13  --    CREATININE  --  0.4* 0.6  --      EstCrCl: Estimated Creatinine Clearance: 165 mL/min (based on SCr of 0.6 mg/dL).  Urine Output:  No intake or output data in the 24 hours ending 01/11/25 1637      Pertinent Cultures:  Date    Source    Result  1/6   Blood    Acinetobacter baumannii (KALE <2)  1/7   Blood    Acinetobacter baumannii  1/8   Catheter tip   In process    Aminoglycoside levels:   No results for input(s): \"GENTPEAK\" in the last 72 hours. No results for input(s): \"GENTTROUGH\" in the last 72 hours.  Recent Labs     01/09/25  1525 01/09/25  2110 01/10/25  1507   GENTRANDOM 11.5* 4.3* 23.7*       Pharmacokinetics: drawn after 540 mg dose hung on 01/09/2025 at 1325  First serum concentration: 11.5 mg/L on 1/9 at 1525  Second serum concentration: 4.3 mg/L on 1/9 at 2110  Elimination rate constant (ke): 0.163 1/hr  Half-life (t1/2): 4.25 hours  Calculated peak concentration: 13.6 mg/L  Time to undetectable: 22 hours  Volume of distribution (Vd): 40 L (0.4 L/kg)    Wt Readings from Last 3 Encounters:   01/11/25 108.4 kg (239 lb)   11/05/24 99.5 kg (219 lb 6.4 oz)   09/03/24 96.2 kg (212 lb)     Assessment/Plan:  Pt is 34 yof started on 
PHARMACY AMINOGLYCOSIDE MONITORING SERVICE     Marissa Flores is a 34 y.o. female started on gentamicin for acinetobacter baumannii blood stream infection. Pharmacy consulted by Dr. Mtz for monitoring and adjustment.    Indication for treatment: Acinetobater baumannii bacteremia   Target peak: Peak:KALE ratio of 8-12, KALE <2 therefore target peak 18-25 mg/L, ideally 20  Target trough: <0.5 mg/L    Other antimicrobials: meropenem    Ideal body weight: 57 kg (125 lb 10.6 oz)  Adjusted ideal body weight: 80.2 kg (176 lb 12.9 oz)    Current weight: 107 kg  Weight: 1/13: 115 kg  Weight: 1/11: 108.4 kg  Weight 1/10: 110kg (per RN who weighed pt 1/10 @ 1150)  Weight 1/09: 105.4kg    Pertinent Laboratory Values:   Recent Labs     01/11/25  0521 01/12/25  0902 01/13/25  0620   WBC 5.3 7.9 6.8   BUN  --  9 7   CREATININE  --  0.5* 0.5*     EstCrCl: Estimated Creatinine Clearance: 205 mL/min (A) (based on SCr of 0.5 mg/dL (L)).  Urine Output:    Intake/Output Summary (Last 24 hours) at 1/13/2025 1224  Last data filed at 1/13/2025 1127  Gross per 24 hour   Intake 2626.12 ml   Output --   Net 2626.12 ml         Pertinent Cultures:  Date    Source    Result  1/6   Blood    Acinetobacter baumannii (KALE <2)  1/7   Blood    Acinetobacter baumannii  1/8   Catheter tip   Acinetobacter    Aminoglycoside levels:   No results for input(s): \"GENTPEAK\" in the last 72 hours. No results for input(s): \"GENTTROUGH\" in the last 72 hours.  Recent Labs     01/10/25  1507 01/10/25  2300 01/12/25  1455   GENTRANDOM 23.7* 5.6 UG/ML 0.8*       Pharmacokinetics: drawn after 800 mg dose hung on 01/10/2025 at 1340  First serum concentration: 23.7 mg/L on 1/10 at 1507  Second serum concentration: 5.6 mg/L on 1/10 at 2300  Elimination rate constant (ke): 0.180 1/hr  Half-life (t1/2): 3.85 hours  Peak: 23.7 mg/L, drawn at correct time.     Levels:  1/9 @ 1525 = 11.5 mcg/mL - 1 hour after end of 1st dose of 540 mg  1/9 @ 2110 =  4.3 mcg/mL - 6.75 hours 
Patient moved to labor and delivery earlier today for blood pressure monitoring due to elevations noted on floor. Severe range BP and mild range Bps this afternoon with improvement this evening. Patient asymptomatic. Repeat LFTs were drawn due to upward trend noted. AST/ALT 73/46, increased from yesterday 51/36. AST just under twice the upper limit of normal. Platelets today 165. Discussed with patient criteria for severe preeclampsia and recommendation for BP monitoring overnight with repeat CBC and CMP tomorrow morning with possible discharge tomorrow. Plan discussed with patient and questions were answered.     Priti Emerson,   1/13/2025 8:38 PM     
Pt ambulated independently to chair. Pt attempting to pump at this time.   
Pt given discharge instructions. Written discharge instructions also given. Reminded pt to make appointment with doctor. Denies any questions or concerns at this time.   
RN attempted to notify attending, Dr. Banerjee, of 20lb weight gain since 1/6/25. Perfect Serve redirected RN to Dr. Dejesus, who is covering. RN notified Dr. Dejesus of concern. MD advised RN to notify primary team, as she would normally consult critical care or internal medicine for \"a septic patient in the ICU that gained 20 pounds.\" Attending listed in chart is OB/GYN. RN also attempted to notify Dr. Irizarry of concern, per Dr. Dejesus. Per Dr. Irizarry \"Update primary. I'm the consult service. Not sure what I can do with that info. Patient is otherwise hemodynamically stable as you can see from her blood pressure and heart rate trend. Not sure how accurate the weight was when it was last checked. We can trend daily weight.\"    RN to order daily weight measurements per MD.  
Rapid response called for temp of 102.0 axillary .   
Spiritual Health Assessment/Progress Note  [facility]    (P) Crisis, (P) Rapid Response,  ,      Name: Marissa Flores MRN: [id number]    Age: 34 y.o.     Sex: female   Language: English   Taoist: None   Sepsis (HCC)     Date: 1/8/2025            Total Time Calculated: (P) 155 min              Spiritual Assessment began in [facility] ICU        Referral/Consult From: (P) Other (comment) (Rapid Response)   Encounter Overview/Reason: (P) Crisis  Service Provided For: (P) Family    Marizol, Belief, Meaning:   Patient unable to assess at this time  Family/Friends identify as spiritual and have beliefs or practices that help with coping during difficult times      Importance and Influence:  Patient: Unable to assess  Family/Friends have spiritual/personal beliefs that influence decisions regarding the patient's health    Community:  Patient Other: Unknown  Family/Friends feel well-supported. Support system includes: Spouse/Partner, Parent/s, and Children    Assessment and Plan of Care:     Patient Interventions include: Other: None  Family/Friends Interventions include: Facilitated expression of thoughts and feelings, Explored spiritual coping/struggle/distress, Affirmed coping skills/support systems, Provided sacramental/Uatsdin ritual, and Facilitated life review and/or legacy    Patient Plan of Care: Spiritual Care available upon further referral  Family/Friends Plan of Care: Spiritual Care available upon further referral    Electronically signed by Eliu Ahuja MDiv, MHA, [facility]  on 1/8/2025 at 5:36 PM       01/08/25 1722   Encounter Summary   Encounter Overview/Reason Crisis   Service Provided For Family   Referral/Consult From Other (comment)  (Rapid Response)   Support System Spouse;Parent;Children;Family members   Last Encounter  01/08/25  (Rapid Response spt/prayer w/spouse & parents)   Complexity of Encounter High   Begin Time 1112   End Time  1347   Total Time Calculated 155 min   Crisis   Type 
Spouse taken back to recovery room to be with patient.   
Subjective:     Postpartum Day 2:   The patient feels tired. The patient has emotional concerns. Pain is well controlled with current medications. The baby is at [facility].   The patient is ambulating well. The patient is tolerating a normal diet.    Objective:        Vitals:    01/10/25 0000   BP:    Pulse: 77   Resp: 15   Temp:    SpO2:        Lab Results   Component Value Date    WBC 8.4 01/09/2025    HGB 6.4 (LL) 01/09/2025    HCT 19.7 (L) 01/09/2025    MCV 85.6 01/09/2025     (L) 01/06/2025       General:    alert, appears stated age, and cooperative       Lochia:  appropriate   Uterine    firm       DVT Evaluation:  No evidence of DVT seen on physical exam.     Assessment:     Postpartum day 2 Doing well postoperatively.  Postoperative course complicated by Sepsis     Plan:     Continue current care.    Minh Banerjee MD 1/10/2025 11:37 AM    
Subjective:     Postpartum Day 3:  Delivery    The patient feels well. The patient denies emotional concerns. Pain is well controlled with current medications. The baby is well @ [facility].  Urinary output is adequate. The patient is ambulating well. The patient is tolerating a normal diet. Flatus has been passed.    Objective:    VITALS:  /80   Pulse 61   Temp 99.8 °F (37.7 °C) (Oral)   Resp 12   Ht 1.651 m (5' 5\")   Wt 108.4 kg (239 lb)   SpO2 96%   Breastfeeding Unknown   BMI 39.77 kg/m²   24HR INTAKE/OUTPUT:  No intake or output data in the 24 hours ending 25 0746    Vitals:    25 0015   BP: 137/80   Pulse: 61   Resp: 12   Temp: 99.8 °F (37.7 °C)   SpO2:          General:    alert, appears stated age, and cooperative       Lochia:  appropriate   Uterine Fundus:   firm   Incision:  healing well, no significant drainage, no dehiscence, no significant erythema   DVT Evaluation:  No evidence of DVT seen on physical exam.     CBC   Lab Results   Component Value Date    WBC 5.3 2025    HGB 7.4 (L) 2025    HCT 23.0 (L) 2025    MCV 85.5 2025     (L) 2025        Assessment:     Status post  section. Doing well postoperatively.     Plan:     POD#3 s/p  section  Sepsis: Acinetobacter baumannii and Coronavirus HKU1. On Meropenem and gentamicin per ID, appreciate input from ID and IM  Anemia: h/h stable. Will start iron  Weight gain: uncertain of accuracy, pt was also likely dehydrated on admission. Will monitor daily weights.     Stable from a surgical standpoint to transfer out of ICU when cleared by IM and ID.     
Subjective:     Postpartum Day 4:  Delivery    The patient feels well. The patient denies emotional concerns. Pain is well controlled with current medications. The baby is @ [facility].  Urinary output is adequate. The patient is ambulating well. The patient is tolerating a normal diet. Flatus has been passed.    Objective:    VITALS:  /85   Pulse 62   Temp 98.9 °F (37.2 °C) (Oral)   Resp 16   Ht 1.651 m (5' 5\")   Wt 108.4 kg (239 lb)   SpO2 96%   Breastfeeding Unknown   BMI 39.77 kg/m²     Vitals:    25 0216   BP: 132/85   Pulse: 62   Resp: 16   Temp: 98.9 °F (37.2 °C)   SpO2:          General:    alert, appears stated age, and cooperative       Lochia:  appropriate   Uterine Fundus:   firm   Incision:  healing well, no significant drainage, no dehiscence, no significant erythema   DVT Evaluation:  No evidence of DVT seen on physical exam.     CBC   Lab Results   Component Value Date    WBC 5.3 2025    HGB 7.4 (L) 2025    HCT 23.0 (L) 2025    MCV 85.5 2025     (L) 2025        Assessment:     Status post  section. Doing well postoperatively.     Plan:       POD#4 s/p  section  Sepsis: Acinetobacter baumannii and Coronavirus HKU1. On Meropenem and gentamicin per ID, appreciate input from ID and IM  Anemia: s/p venofer     Pt prefers to stay in ICU stepdown for now. Awaiting culture results and recommendations from ID for outpatient abx.        
The patient was seen in the ICU on postoperative day #0.  She was resting comfortably in bed with her family present.  Patient appears comfortable with stable vital signs.  No abdominal symptoms related.  01/08/25 2100 98.8 (37.1) 26 97 108/71 83   Patient and family were given time to ask questions which were answered.  
This RN to ED 11 for NST. EFM and TOCO placed, baseline 150 with moderate variability, accelerations present, with no decelerations. Patient states she feels fetal movement now that monitors have been placed. Patient denies contractions, vaginal bleeding, or leaking fluid. JUSTIN Whaley CNM updated that patient's NST is reactive.   
check at 1 week and pp visit at 6-8 weeks in the office.     Time Spent: 15 min    DIANE Hobbs CNM  
normal. Recommend repeating labs tomorrow morning and trending blood pressures. Did discuss with midwife and nurse manager and plan for patient to be moved to L&D for BP monitoring. Patient desires strongly to go home later today if able. Plan of care extensively discussed and questions answered. Discussed if Bps are trended and controlled we can consider discharge home later today with BP check in the office later this week.   -Continue postop care   -S/p IV venofer for anemia, did also receive 1 unit of PRBCs postop. Hgb 7 this AM. Oral iron supplementation added.    -Baby is at [facility]   -Recommendation for 2 weeks postpartum follow up for incision check after discharge    Sepsis   -IM and ID consulted, appreciate recommendations. Improving.   -Acinetobacter baumannii and Coronavirus HKU1. PICC line tip was positive for Acinnos >100 CFU/ml and was removed 1/8. Currently on Merrem and gentamicin per ID. Cx from 1/10 NGTD. Discussed with IM and ID, report pt is stable for discharge from their point of view. Patient will be receiving IV abx after discharge.     Priti Emerson,  1/13/2025 9:29 AM    
32 mmol/L    Anion Gap 9 9 - 17 mmol/L    Glucose 92 74 - 99 mg/dL    BUN 4 (L) 7 - 20 mg/dL    Creatinine 0.4 (L) 0.6 - 1.1 mg/dL    Est, Glom Filt Rate >90 >60 mL/min/1.73m2    Calcium 8.1 (L) 8.3 - 10.6 mg/dL    Total Protein 4.9 (L) 6.4 - 8.2 g/dL    Albumin 2.5 (L) 3.4 - 5.0 g/dL    Albumin/Globulin Ratio 1.1 1.1 - 2.2    Total Bilirubin 0.8 0.0 - 1.0 mg/dL    Alkaline Phosphatase 111 40 - 129 U/L    ALT 19 10 - 40 U/L    AST 49 (H) 15 - 37 U/L   C-Reactive Protein    Collection Time: 01/08/25  3:24 AM   Result Value Ref Range    CRP 99.2 (H) 0.0 - 5.0 mg/L   Procalcitonin    Collection Time: 01/08/25  3:24 AM   Result Value Ref Range    Procalcitonin 2.09 ng/mL     CULTURE results: Invalid input(s): \"BLOOD CULTURE\", \"URINE CULTURE\", \"SURGICAL CULTURE\"    Diagnosis:  Patient Active Problem List   Diagnosis    Sepsis (HCC)    31 weeks gestation of pregnancy    Tachycardia       Active Problems  Principal Problem:    Sepsis (HCC)  Active Problems:    31 weeks gestation of pregnancy    Tachycardia  Resolved Problems:    * No resolved hospital problems. *              Electronically signed by: Electronically signed by DIANE Onofre CNP on 1/8/2025 at 9:49 AM   
Index A4C 58 mL/m2    LVIDd Index 2.38 cm/m2    LVIDs Index 1.89 cm/m2    LV RWT Ratio 0.41     LV Mass 2D 141.9 67 - 162 g    LV Mass 2D Index 68.9 43 - 95 g/m2    MV E/A 1.57     E/E' Ratio (Averaged) 7.37     E/E' Lateral 5.99     E/E' Septal 8.75     LVOT Stroke Volume Index 31.4 mL/m2    LA Size Index 1.80 cm/m2    LA/AO Root Ratio 1.37     Ao Root Index 1.31 cm/m2    AV Velocity Ratio 0.90     LVOT:AV VTI Index 1.02     WILFRIDO/BSA VTI 1.7 cm2/m2    WILFRIDO/BSA Peak Velocity 1.6 cm2/m2    RVSP 45 mmHg    Body Surface Area 2.14 m2    EF Physician 45 %   Lactic Acid    Collection Time: 01/09/25  9:20 AM   Result Value Ref Range    Lactic Acid 1.2 0.4 - 2.0 mmol/L   Basic Metabolic Panel    Collection Time: 01/09/25  9:20 AM   Result Value Ref Range    Sodium 137 136 - 145 mmol/L    Potassium 4.2 3.5 - 5.1 mmol/L    Chloride 108 99 - 110 mmol/L    CO2 23 21 - 32 mmol/L    Anion Gap 7 (L) 9 - 17 mmol/L    Glucose 74 74 - 99 mg/dL    BUN 7 7 - 20 mg/dL    Creatinine 0.4 (L) 0.6 - 1.1 mg/dL    Est, Glom Filt Rate >90 >60 mL/min/1.73m2    Calcium 8.1 (L) 8.3 - 10.6 mg/dL   Hemoglobin and Hematocrit    Collection Time: 01/09/25  9:20 AM   Result Value Ref Range    Hemoglobin 6.4 (LL) 12.5 - 16.0 g/dL    Hematocrit 19.7 (L) 37.0 - 47.0 %   Gentamicin, Random    Collection Time: 01/09/25  3:25 PM   Result Value Ref Range    Gentamicin Rm 11.5 (H) 5 - 10 ug/mL    Gent Random Dose Amount Unknown     Gent Random Date Last Dose UNK^Unknown^L     Gent Random Dose Time UNK^Unknown^L      CULTURE results: Invalid input(s): \"BLOOD CULTURE\", \"URINE CULTURE\", \"SURGICAL CULTURE\"    Diagnosis:  Patient Active Problem List   Diagnosis    Sepsis (HCC)    31 weeks gestation of pregnancy    Tachycardia    Infection due to acinetobacter baumannii    Bloodstream infection due to central venous catheter    Abnormal fetal heart rate affecting pregnancy       Active Problems  Principal Problem:    Sepsis ([facility])  Active Problems:    31 weeks 
Prabhjot Irizarry MD on 1/12/2025 at 8:23 AM    
the past 24 hour(s))   Gentamicin, Random    Collection Time: 01/10/25  3:07 PM   Result Value Ref Range    Gentamicin Rm 23.7 (H) 5 - 10 ug/mL    Gent Random Dose Amount Unknown     Gent Random Date Last Dose UNK^Unknown^L     Gent Random Dose Time UNK^Unknown^L    Sedimentation Rate    Collection Time: 01/11/25  5:21 AM   Result Value Ref Range    Sed Rate, Automated 7 0 - 20 mm/Hr   CBC with Diff    Collection Time: 01/11/25  5:21 AM   Result Value Ref Range    WBC 5.3 4.0 - 10.5 k/uL    RBC 2.69 (L) 4.20 - 5.40 m/uL    Hemoglobin 7.4 (L) 12.5 - 16.0 g/dL    Hematocrit 23.0 (L) 37.0 - 47.0 %    MCV 85.5 78.0 - 100.0 fL    MCH 27.5 27.0 - 31.0 pg    MCHC 32.2 32.0 - 36.0 g/dL    RDW 14.2 11.7 - 14.9 %    Platelets 112 (L) 140 - 440 k/uL    MPV 12.3 (H) 7.5 - 11.1 fL    Neutrophils % 56 36 - 66 %    Lymphocytes % 31 24 - 44 %    Monocytes % 6 (H) 0 - 4 %    Eosinophils % 5 (H) 0 - 3 %    Basophils % 1 0 - 1 %    Immature Granulocytes % 2 (H) 0 %    Neutrophils Absolute 2.94 k/uL    Lymphocytes Absolute 1.64 k/uL    Monocytes Absolute 0.30 k/uL    Eosinophils Absolute 0.25 k/uL    Basophils Absolute 0.05 k/uL    Immature Granulocytes Absolute 0.11 k/uL   C-Reactive Protein    Collection Time: 01/11/25  5:21 AM   Result Value Ref Range    CRP 32.7 (H) 0.0 - 5.0 mg/L   Procalcitonin    Collection Time: 01/11/25  5:21 AM   Result Value Ref Range    Procalcitonin 2.48 ng/mL        Imaging/Diagnostics Last 24 Hours   No results found.    Electronically signed by Prabhjot Irizarry MD on 1/11/2025 at 1:20 PM    
Platelet Estimate PENDING    Procalcitonin    Collection Time: 01/13/25  6:20 AM   Result Value Ref Range    Procalcitonin 0.53 ng/mL          Imaging/Diagnostics Last 24 Hours   No results found.    Electronically signed by Prabhjot Irizarry MD on 1/13/2025 at 8:57 AM

## 2025-01-14 NOTE — DISCHARGE SUMMARY
Obstetrical Discharge Form    Gestational Age:  31w3d    Antepartum complications: maternal sepsis d/t PICC line infection, anemia    Date of Delivery:   25      Type of Delivery:    for fetal distress and maternal sepsis/acute event    Delivered By:    Dr. Banerjee             Baby:       Information for the patient's :  Jonh Flores [[id number]]      Anesthesia:    General    Intrapartum complications: maternal sepsis,  birth, c/s for nrfhr and acute maternal event    Feeding method:   pumping    Postpartum complications: anemia and sepsis, hypertension    Discharge Date:   25    Condition of discharge:  good    Plan:   Follow up    3 days for bp check, 1 week for incision check and 6 weeks for pp visit.     DIANE Hobbs CNM

## 2025-01-15 DIAGNOSIS — A41.9: Primary | ICD-10-CM

## 2025-01-15 DIAGNOSIS — T82.7XXD: Primary | ICD-10-CM

## 2025-01-15 NOTE — CARE COORDINATION
Pt active with [facility] for IV hydration LR plus zofran pump and per Jessica Kumari Np she wants the pt to have IV atbs per her note... (Cont IV Merrem 2gm q8hrs and Bactrim DS 1 tab twice daily x 14 days, EOT 1/23/25  Weekly labs drawn on Monday during the course of treatment  CBC with differential, CMP, ESR, CRP,   Cathflo for blocked vascular access as needed  Fax results to Attn: [facility]  # [phone]  See in clinic within one week after discharge)   1674 spoke with Kathryn Coordinator at [facility] and gave verbal for IV atbs per Jessica note (see above).  Extended atb date by 1 day d/t pt missing atb doses so new EOT is 1/24/25. Per Kathryn at [facility] atb treatment in the home will begin tonight 1/15/25. Notified Jessica.

## 2025-01-16 ENCOUNTER — OFFICE VISIT (OUTPATIENT)
Dept: OBGYN | Age: 35
End: 2025-01-16

## 2025-01-16 ENCOUNTER — TELEPHONE (OUTPATIENT)
Dept: OBGYN | Age: 35
End: 2025-01-16

## 2025-01-16 ENCOUNTER — HOSPITAL ENCOUNTER (OUTPATIENT)
Age: 35
Discharge: HOME | End: 2025-01-16
Payer: MEDICAID

## 2025-01-16 VITALS
HEART RATE: 83 BPM | DIASTOLIC BLOOD PRESSURE: 81 MMHG | SYSTOLIC BLOOD PRESSURE: 124 MMHG | WEIGHT: 217 LBS | BODY MASS INDEX: 36.11 KG/M2

## 2025-01-16 DIAGNOSIS — Z09 POSTOPERATIVE EXAMINATION: Primary | ICD-10-CM

## 2025-01-16 LAB
ALBUMIN SERPL-MCNC: 3.2 G/DL (ref 3.4–5)
ALBUMIN/GLOB SERPL: 1.1 {RATIO} (ref 1.1–2.2)
ALP SERPL-CCNC: 292 U/L (ref 40–129)
ALT SERPL-CCNC: 30 U/L (ref 10–40)
ANION GAP SERPL CALCULATED.3IONS-SCNC: 9 MMOL/L (ref 9–17)
AST SERPL-CCNC: 30 U/L (ref 15–37)
BASOPHILS # BLD: 0.04 K/UL
BASOPHILS NFR BLD: 1 % (ref 0–1)
BILIRUB SERPL-MCNC: 0.2 MG/DL (ref 0–1)
BUN SERPL-MCNC: 8 MG/DL (ref 7–20)
CALCIUM SERPL-MCNC: 8.9 MG/DL (ref 8.3–10.6)
CHLORIDE SERPL-SCNC: 106 MMOL/L (ref 99–110)
CO2 SERPL-SCNC: 25 MMOL/L (ref 21–32)
CREAT SERPL-MCNC: 0.9 MG/DL (ref 0.6–1.1)
EOSINOPHIL # BLD: 0.24 K/UL
EOSINOPHILS RELATIVE PERCENT: 4 % (ref 0–3)
ERYTHROCYTE [DISTWIDTH] IN BLOOD BY AUTOMATED COUNT: 17.1 % (ref 11.7–14.9)
GFR, ESTIMATED: 77 ML/MIN/1.73M2
GLUCOSE SERPL-MCNC: 80 MG/DL (ref 74–99)
HCT VFR BLD AUTO: 31 % (ref 37–47)
HGB BLD-MCNC: 9.3 G/DL (ref 12.5–16)
IMM GRANULOCYTES # BLD AUTO: 0.09 K/UL
IMM GRANULOCYTES NFR BLD: 2 %
LYMPHOCYTES NFR BLD: 1.3 K/UL
LYMPHOCYTES RELATIVE PERCENT: 22 % (ref 24–44)
MCH RBC QN AUTO: 27.8 PG (ref 27–31)
MCHC RBC AUTO-ENTMCNC: 30 G/DL (ref 32–36)
MCV RBC AUTO: 92.5 FL (ref 78–100)
MICROORGANISM SPEC CULT: NORMAL
MICROORGANISM SPEC CULT: NORMAL
MONOCYTES NFR BLD: 0.36 K/UL
MONOCYTES NFR BLD: 6 % (ref 0–4)
NEUTROPHILS NFR BLD: 66 % (ref 36–66)
NEUTS SEG NFR BLD: 3.94 K/UL
PLATELET # BLD AUTO: 299 K/UL (ref 140–440)
PMV BLD AUTO: 10.8 FL (ref 7.5–11.1)
POTASSIUM SERPL-SCNC: 4.7 MMOL/L (ref 3.5–5.1)
PROT SERPL-MCNC: 6.1 G/DL (ref 6.4–8.2)
RBC # BLD AUTO: 3.35 M/UL (ref 4.2–5.4)
SERVICE CMNT-IMP: NORMAL
SERVICE CMNT-IMP: NORMAL
SODIUM SERPL-SCNC: 139 MMOL/L (ref 136–145)
SPECIMEN DESCRIPTION: NORMAL
SPECIMEN DESCRIPTION: NORMAL
WBC OTHER # BLD: 6 K/UL (ref 4–10.5)

## 2025-01-16 PROCEDURE — 80053 COMPREHEN METABOLIC PANEL: CPT

## 2025-01-16 PROCEDURE — 85025 COMPLETE CBC W/AUTO DIFF WBC: CPT

## 2025-01-16 PROCEDURE — 99024 POSTOP FOLLOW-UP VISIT: CPT | Performed by: OBSTETRICS & GYNECOLOGY

## 2025-01-16 ASSESSMENT — PATIENT HEALTH QUESTIONNAIRE - PHQ9
SUM OF ALL RESPONSES TO PHQ QUESTIONS 1-9: 0
10. IF YOU CHECKED OFF ANY PROBLEMS, HOW DIFFICULT HAVE THESE PROBLEMS MADE IT FOR YOU TO DO YOUR WORK, TAKE CARE OF THINGS AT HOME, OR GET ALONG WITH OTHER PEOPLE: NOT DIFFICULT AT ALL
SUM OF ALL RESPONSES TO PHQ QUESTIONS 1-9: 0
4. FEELING TIRED OR HAVING LITTLE ENERGY: NOT AT ALL
1. LITTLE INTEREST OR PLEASURE IN DOING THINGS: NOT AT ALL
SUM OF ALL RESPONSES TO PHQ QUESTIONS 1-9: 0
8. MOVING OR SPEAKING SO SLOWLY THAT OTHER PEOPLE COULD HAVE NOTICED. OR THE OPPOSITE, BEING SO FIGETY OR RESTLESS THAT YOU HAVE BEEN MOVING AROUND A LOT MORE THAN USUAL: NOT AT ALL
5. POOR APPETITE OR OVEREATING: NOT AT ALL
2. FEELING DOWN, DEPRESSED OR HOPELESS: NOT AT ALL
SUM OF ALL RESPONSES TO PHQ9 QUESTIONS 1 & 2: 0
SUM OF ALL RESPONSES TO PHQ QUESTIONS 1-9: 0
7. TROUBLE CONCENTRATING ON THINGS, SUCH AS READING THE NEWSPAPER OR WATCHING TELEVISION: NOT AT ALL
9. THOUGHTS THAT YOU WOULD BE BETTER OFF DEAD, OR OF HURTING YOURSELF: NOT AT ALL
6. FEELING BAD ABOUT YOURSELF - OR THAT YOU ARE A FAILURE OR HAVE LET YOURSELF OR YOUR FAMILY DOWN: NOT AT ALL
3. TROUBLE FALLING OR STAYING ASLEEP: NOT AT ALL

## 2025-01-16 NOTE — PROGRESS NOTES
Incision clean dry and intact, Steri-Strips removed.  Vitals reviewed.  Patient is afebrile.  Labs were reviewed.  Liver functions have normalized.  Hemoglobin is increasing.  Pain is well-controlled.  Appears to be recovering well at this point.  Will follow-up for her next postoperative visit in 4 weeks.

## 2025-01-20 ENCOUNTER — HOSPITAL ENCOUNTER (OUTPATIENT)
Age: 35
Setting detail: SPECIMEN
Discharge: HOME | End: 2025-01-20
Payer: MEDICAID

## 2025-01-20 LAB
ALBUMIN SERPL-MCNC: 3.9 G/DL (ref 3.4–5)
ALBUMIN/GLOB SERPL: 1.4 {RATIO} (ref 1.1–2.2)
ALP SERPL-CCNC: 200 U/L (ref 40–129)
ALT SERPL-CCNC: 18 U/L (ref 10–40)
ANION GAP SERPL CALCULATED.3IONS-SCNC: 13 MMOL/L (ref 9–17)
AST SERPL-CCNC: 20 U/L (ref 15–37)
BASOPHILS # BLD: 0.06 K/UL
BASOPHILS NFR BLD: 1 % (ref 0–1)
BILIRUB SERPL-MCNC: 0.3 MG/DL (ref 0–1)
BUN SERPL-MCNC: 12 MG/DL (ref 7–20)
CALCIUM SERPL-MCNC: 9.6 MG/DL (ref 8.3–10.6)
CHLORIDE SERPL-SCNC: 101 MMOL/L (ref 99–110)
CO2 SERPL-SCNC: 24 MMOL/L (ref 21–32)
CREAT SERPL-MCNC: 0.8 MG/DL (ref 0.6–1.1)
CRP SERPL HS-MCNC: <3 MG/L (ref 0–5)
EOSINOPHIL # BLD: 0.11 K/UL
EOSINOPHILS RELATIVE PERCENT: 2 % (ref 0–3)
ERYTHROCYTE [DISTWIDTH] IN BLOOD BY AUTOMATED COUNT: 16 % (ref 11.7–14.9)
ERYTHROCYTE [SEDIMENTATION RATE] IN BLOOD BY WESTERGREN METHOD: 35 MM/HR (ref 0–20)
GFR, ESTIMATED: 86 ML/MIN/1.73M2
GLUCOSE SERPL-MCNC: 104 MG/DL (ref 74–99)
HCT VFR BLD AUTO: 36.5 % (ref 37–47)
HGB BLD-MCNC: 11.5 G/DL (ref 12.5–16)
IMM GRANULOCYTES # BLD AUTO: 0.01 K/UL
IMM GRANULOCYTES NFR BLD: 0 %
LYMPHOCYTES NFR BLD: 2.36 K/UL
LYMPHOCYTES RELATIVE PERCENT: 36 % (ref 24–44)
MCH RBC QN AUTO: 28 PG (ref 27–31)
MCHC RBC AUTO-ENTMCNC: 31.5 G/DL (ref 32–36)
MCV RBC AUTO: 89 FL (ref 78–100)
MONOCYTES NFR BLD: 0.37 K/UL
MONOCYTES NFR BLD: 6 % (ref 0–4)
NEUTROPHILS NFR BLD: 55 % (ref 36–66)
NEUTS SEG NFR BLD: 3.63 K/UL
PLATELET # BLD AUTO: 482 K/UL (ref 140–440)
PMV BLD AUTO: 11.4 FL (ref 7.5–11.1)
POTASSIUM SERPL-SCNC: 4.7 MMOL/L (ref 3.5–5.1)
PROT SERPL-MCNC: 6.7 G/DL (ref 6.4–8.2)
RBC # BLD AUTO: 4.1 M/UL (ref 4.2–5.4)
SODIUM SERPL-SCNC: 137 MMOL/L (ref 136–145)
WBC OTHER # BLD: 6.5 K/UL (ref 4–10.5)

## 2025-01-20 PROCEDURE — 85652 RBC SED RATE AUTOMATED: CPT

## 2025-01-20 PROCEDURE — 85025 COMPLETE CBC W/AUTO DIFF WBC: CPT

## 2025-01-20 PROCEDURE — 86140 C-REACTIVE PROTEIN: CPT

## 2025-01-20 PROCEDURE — 80053 COMPREHEN METABOLIC PANEL: CPT

## 2025-01-22 ENCOUNTER — OFFICE VISIT (OUTPATIENT)
Age: 35
End: 2025-01-22
Payer: MEDICAID

## 2025-01-22 VITALS
SYSTOLIC BLOOD PRESSURE: 128 MMHG | DIASTOLIC BLOOD PRESSURE: 76 MMHG | RESPIRATION RATE: 16 BRPM | HEART RATE: 90 BPM | OXYGEN SATURATION: 100 % | HEIGHT: 65 IN | BODY MASS INDEX: 33.92 KG/M2 | WEIGHT: 203.6 LBS

## 2025-01-22 DIAGNOSIS — T82.7XXD: ICD-10-CM

## 2025-01-22 DIAGNOSIS — A41.9: ICD-10-CM

## 2025-01-22 DIAGNOSIS — A49.8 INFECTION DUE TO ACINETOBACTER BAUMANNII: ICD-10-CM

## 2025-01-22 DIAGNOSIS — Z09 HOSPITAL DISCHARGE FOLLOW-UP: Primary | ICD-10-CM

## 2025-01-22 PROCEDURE — 1111F DSCHRG MED/CURRENT MED MERGE: CPT

## 2025-01-22 PROCEDURE — 99214 OFFICE O/P EST MOD 30 MIN: CPT

## 2025-01-22 ASSESSMENT — PATIENT HEALTH QUESTIONNAIRE - PHQ9
8. MOVING OR SPEAKING SO SLOWLY THAT OTHER PEOPLE COULD HAVE NOTICED. OR THE OPPOSITE, BEING SO FIGETY OR RESTLESS THAT YOU HAVE BEEN MOVING AROUND A LOT MORE THAN USUAL: NOT AT ALL
SUM OF ALL RESPONSES TO PHQ QUESTIONS 1-9: 3
SUM OF ALL RESPONSES TO PHQ QUESTIONS 1-9: 3
2. FEELING DOWN, DEPRESSED OR HOPELESS: NOT AT ALL
6. FEELING BAD ABOUT YOURSELF - OR THAT YOU ARE A FAILURE OR HAVE LET YOURSELF OR YOUR FAMILY DOWN: NOT AT ALL
1. LITTLE INTEREST OR PLEASURE IN DOING THINGS: NOT AT ALL
10. IF YOU CHECKED OFF ANY PROBLEMS, HOW DIFFICULT HAVE THESE PROBLEMS MADE IT FOR YOU TO DO YOUR WORK, TAKE CARE OF THINGS AT HOME, OR GET ALONG WITH OTHER PEOPLE: NOT DIFFICULT AT ALL
9. THOUGHTS THAT YOU WOULD BE BETTER OFF DEAD, OR OF HURTING YOURSELF: NOT AT ALL
3. TROUBLE FALLING OR STAYING ASLEEP: NOT AT ALL
5. POOR APPETITE OR OVEREATING: NOT AT ALL
SUM OF ALL RESPONSES TO PHQ9 QUESTIONS 1 & 2: 0
SUM OF ALL RESPONSES TO PHQ QUESTIONS 1-9: 3
7. TROUBLE CONCENTRATING ON THINGS, SUCH AS READING THE NEWSPAPER OR WATCHING TELEVISION: NOT AT ALL
4. FEELING TIRED OR HAVING LITTLE ENERGY: NEARLY EVERY DAY
SUM OF ALL RESPONSES TO PHQ QUESTIONS 1-9: 3

## 2025-01-22 NOTE — PATIENT INSTRUCTIONS
We are committed to providing you the best care possible.    If you receive a survey after visiting one of our offices, please take time to share your experience concerning your physician office visit.  These surveys are confidential and no health information about you is shared.    We are eager to improve for you and continue to give you satisfactory care, we are counting on your feedback to help make that happen.            Welcome to [facility]:    Did you know we now have a faster way for you to move through your appointment? For your convenience, we now have digital registration available. When you schedule your next appointment, you will receive a link via your email as well as a text message that will allow you to complete any paperwork digitally before your appointment.       [employer]

## 2025-01-22 NOTE — PROGRESS NOTES
Post-Discharge Transitional Care Follow Up      Marissa Flores   YOB: 1990    Date of Office Visit:  1/22/2025  Date of Hospital Admission: 1/6/25  Date of Hospital Discharge: 1/14/25  Readmission Risk Score (high >=14%. Medium >=10%):Readmission Risk Score: 11      Care management risk score Rising risk (score 2-5) and Complex Care (Scores >=6): No Risk Score On File     Non face to face  following discharge, date last encounter closed (first attempt may have been earlier): *No documented post hospital discharge outreach found in the last 14 days     Call initiated 2 business days of discharge: *No response recorded in the last 14 days     Hospital discharge follow-up  -     NC DISCHARGE MEDS RECONCILED W/ CURRENT OUTPATIENT MED LIST  Sepsis associated with internal vascular access, subsequent encounter (HCC)  Infection due to acinetobacter baumannii    Medical Decision Making: moderate complexity  Return in about 4 weeks (around 2/19/2025) for Mood.    On this date 1/22/2025 I have spent 36 minutes reviewing previous notes, test results and face to face with the patient discussing the diagnosis and importance of compliance with the treatment plan as well as documenting on the day of the visit.       Subjective:     History of Present Illness  The patient presents for evaluation of postpartum complications.    She experienced a sudden onset of chills 2 weeks ago, which was not associated with fever. Her blood glucose level was slightly low, and her blood pressure could not be measured due to her shaking. The symptoms subsided after approximately 1.5 hours under a heated blanket. She had been receiving IV infusions at that time. A similar episode occurred during her second IV infusion, prompting a visit to the emergency room. Despite negative results for influenza and COVID-19, she was diagnosed with an infection and admitted to the medical-surgical unit. She had another episode on Monday night and

## 2025-01-23 ENCOUNTER — TELEPHONE (OUTPATIENT)
Dept: OBGYN | Age: 35
End: 2025-01-23

## 2025-01-23 NOTE — TELEPHONE ENCOUNTER
Pt had  25. Pt states she stopped bleeding for 3-4 days and started bleeding again and feels achy and crampy. Pt would like to know if this is normal after a . Please advise.

## 2025-01-28 ENCOUNTER — TELEPHONE (OUTPATIENT)
Dept: INFECTIOUS DISEASES | Age: 35
End: 2025-01-28

## 2025-01-28 NOTE — TELEPHONE ENCOUNTER
Tony called and states pt would like to get her PICC line removed. [facility] are discharging pt today.    Please advise.

## 2025-02-03 ENCOUNTER — TELEPHONE (OUTPATIENT)
Age: 35
End: 2025-02-03

## 2025-02-03 PROBLEM — Z79.2 RECEIVING INTRAVENOUS ANTIBIOTIC TREATMENT AS OUTPATIENT: Status: ACTIVE | Noted: 2025-02-03

## 2025-02-03 NOTE — TELEPHONE ENCOUNTER
Patient called stating that she has a \"blood clot in right arm for 2 weeks.\" She states that she was admitted for sepsis and appeared right after leaving hospital. When asked if there is any streaking, she states that there are what looks like \"blood vessels around it that are raised.\" Patient states that the arm is very painful. This nurse advised that blood clots are a medical emergency and needs to go to ER ASAP. Patient agreed and will go.

## 2025-02-03 NOTE — PROGRESS NOTES
2/3/2025         Referring Physician: No ref. provider found  Primary Care Physician: Robin Lee MD    Impression/Plan:   Diagnosis Orders   1. Bloodstream infection associated with central venous catheter, subsequent encounter        2. Receiving intravenous antibiotic treatment as outpatient            Discussion:  Assessment & Plan  1. Near syncopal episodes.  Her symptoms may be attributed to dehydration or debilitation following her recent illness. The echocardiogram results do not indicate any cardiac infection, but there is evidence of mild tricuspid valve regurgitation. At this juncture, there is no necessity for additional antibiotics. She is advised to augment her fluid intake, specifically water and unsweetened tea. A referral to Cardiology will be made for further evaluation of her near syncopal episodes. If she experiences fevers, chills, general malaise, fatigue, or any other symptoms, she should seek immediate medical attention at the emergency room.    2. Bloodstream infection.  She was recently hospitalized and diagnosed with a bloodstream infection secondary to a central line, which grew Acinetobacter baumannii and Stenotrophomonas maltophilia. She was treated with IV gentamicin and meropenem, followed by a 14-day course of IV meropenem and oral Bactrim, which ended on 01/23/2024. The infection appears to be resolving well. No new antibiotics are needed at this time.    3. Elevated heart rate.  During her hospitalization, she experienced elevated heart rates, with a peak of 153 bpm on 01/07/2024 and 110 bpm on 01/08/2024. This could be related to sepsis. She reports that her heart rate still varies dramatically throughout the day. A referral to Cardiology will be made for further evaluation.    4. Tricuspid valve regurgitation.  An echocardiogram performed on 01/09/2024 showed mild regurgitation in the tricuspid valve. There was no evidence of endocarditis at that time. This condition does

## 2025-02-05 ENCOUNTER — OFFICE VISIT (OUTPATIENT)
Dept: INFECTIOUS DISEASES | Age: 35
End: 2025-02-05
Payer: MEDICAID

## 2025-02-05 VITALS
SYSTOLIC BLOOD PRESSURE: 128 MMHG | HEART RATE: 134 BPM | DIASTOLIC BLOOD PRESSURE: 88 MMHG | BODY MASS INDEX: 34.28 KG/M2 | WEIGHT: 206 LBS

## 2025-02-05 DIAGNOSIS — R55 POSTURAL DIZZINESS WITH NEAR SYNCOPE: Primary | ICD-10-CM

## 2025-02-05 DIAGNOSIS — Z79.2 RECEIVING INTRAVENOUS ANTIBIOTIC TREATMENT AS OUTPATIENT: ICD-10-CM

## 2025-02-05 DIAGNOSIS — T80.211D BLOODSTREAM INFECTION ASSOCIATED WITH CENTRAL VENOUS CATHETER, SUBSEQUENT ENCOUNTER: ICD-10-CM

## 2025-02-05 DIAGNOSIS — R42 POSTURAL DIZZINESS WITH NEAR SYNCOPE: Primary | ICD-10-CM

## 2025-02-05 PROCEDURE — 1111F DSCHRG MED/CURRENT MED MERGE: CPT | Performed by: NURSE PRACTITIONER

## 2025-02-05 PROCEDURE — 99214 OFFICE O/P EST MOD 30 MIN: CPT | Performed by: NURSE PRACTITIONER

## 2025-02-05 PROCEDURE — 1036F TOBACCO NON-USER: CPT | Performed by: NURSE PRACTITIONER

## 2025-02-05 PROCEDURE — G8417 CALC BMI ABV UP PARAM F/U: HCPCS | Performed by: NURSE PRACTITIONER

## 2025-02-05 PROCEDURE — G8427 DOCREV CUR MEDS BY ELIG CLIN: HCPCS | Performed by: NURSE PRACTITIONER

## 2025-02-05 NOTE — PATIENT INSTRUCTIONS
No need for further ABX as appears infectious process has resolved.    Increase oral fluid intake (especially water) as may be dehydrated as well as added breast feeding.    Recommend to be evaluated by cardiology as having symptoms of dizziness, light headedness and feeling like passing out, to review your echocardiogram as well. Evaluate for near syncope.     Follow up with ID on an as needed basis.

## 2025-02-12 ENCOUNTER — OFFICE VISIT (OUTPATIENT)
Dept: OBGYN | Age: 35
End: 2025-02-12

## 2025-02-12 VITALS
WEIGHT: 209 LBS | DIASTOLIC BLOOD PRESSURE: 69 MMHG | HEIGHT: 65 IN | HEART RATE: 70 BPM | SYSTOLIC BLOOD PRESSURE: 109 MMHG | BODY MASS INDEX: 34.82 KG/M2

## 2025-02-12 DIAGNOSIS — Z09 POSTOPERATIVE EXAMINATION: Primary | ICD-10-CM

## 2025-02-12 PROCEDURE — 99024 POSTOP FOLLOW-UP VISIT: CPT | Performed by: OBSTETRICS & GYNECOLOGY

## 2025-02-25 PROBLEM — F33.1 MODERATE EPISODE OF RECURRENT MAJOR DEPRESSIVE DISORDER (HCC): Status: ACTIVE | Noted: 2025-02-25

## 2025-02-25 PROBLEM — O21.0 HYPEREMESIS GRAVIDARUM: Status: ACTIVE | Noted: 2025-02-25

## 2025-02-25 NOTE — ASSESSMENT & PLAN NOTE
-Uncontrolled  -No SI/HI  -No pharmaceutical treatment at this time due to pregnancy  -Will continue to monitor

## 2025-04-18 ENCOUNTER — OFFICE VISIT (OUTPATIENT)
Age: 35
End: 2025-04-18

## 2025-04-18 VITALS
WEIGHT: 213.2 LBS | HEART RATE: 95 BPM | SYSTOLIC BLOOD PRESSURE: 132 MMHG | DIASTOLIC BLOOD PRESSURE: 78 MMHG | BODY MASS INDEX: 35.48 KG/M2 | OXYGEN SATURATION: 97 % | RESPIRATION RATE: 20 BRPM

## 2025-04-18 DIAGNOSIS — E43: ICD-10-CM

## 2025-04-18 DIAGNOSIS — F43.10 PTSD (POST-TRAUMATIC STRESS DISORDER): Primary | ICD-10-CM

## 2025-04-18 DIAGNOSIS — O26.819 PREGNANCY RELATED FATIGUE, ANTEPARTUM: ICD-10-CM

## 2025-04-18 DIAGNOSIS — N92.6 MISSED PERIOD: ICD-10-CM

## 2025-04-18 LAB
BILIRUBIN, POC: ABNORMAL
BLOOD URINE, POC: ABNORMAL
CLARITY, POC: ABNORMAL
COLOR, POC: ABNORMAL
CONTROL: NORMAL
GLUCOSE URINE, POC: ABNORMAL MG/DL
KETONES, POC: ABNORMAL MG/DL
LEUKOCYTE EST, POC: ABNORMAL
NITRITE, POC: ABNORMAL
PH, POC: 6.5
PREGNANCY TEST URINE, POC: NORMAL
PROTEIN, POC: ABNORMAL MG/DL
SPECIFIC GRAVITY, POC: >=1.03
UROBILINOGEN, POC: ABNORMAL MG/DL

## 2025-04-18 RX ORDER — ESCITALOPRAM OXALATE 5 MG/1
5 TABLET ORAL DAILY
Qty: 30 TABLET | Refills: 0 | Status: SHIPPED | OUTPATIENT
Start: 2025-04-18 | End: 2025-05-18

## 2025-04-18 RX ORDER — ALPRAZOLAM 0.5 MG
0.5 TABLET ORAL
Qty: 1 TABLET | Refills: 0 | Status: SHIPPED | OUTPATIENT
Start: 2025-04-18 | End: 2025-06-01

## 2025-04-18 NOTE — PROGRESS NOTES
source:  No    Results      Robin Lee MD  [facility]  04/18/25  12:44 PM      This dictation was prepared using Dragon Medical voice recognition software. As a result, errors may occur.   The patient (or guardian, if applicable) and other individuals in attendance with the patient were advised that Artificial Intelligence will be utilized during this visit to record, process the conversation to generate a clinical note, and support improvement of the AI technology. The patient (or guardian, if applicable) and other individuals in attendance at the appointment consented to the use of AI, including the recording.

## 2025-04-19 LAB
ALBUMIN SERPL-MCNC: 4.1 G/DL (ref 3.4–5)
ALBUMIN/GLOB SERPL: 1.7 {RATIO} (ref 1.1–2.2)
ALP SERPL-CCNC: 90 U/L (ref 40–129)
ALT SERPL-CCNC: 18 U/L (ref 10–40)
ANION GAP SERPL CALCULATED.3IONS-SCNC: 10 MMOL/L (ref 3–16)
AST SERPL-CCNC: 17 U/L (ref 15–37)
BASOPHILS # BLD: 0.1 K/UL (ref 0–0.2)
BASOPHILS NFR BLD: 0.9 %
BILIRUB SERPL-MCNC: <0.2 MG/DL (ref 0–1)
BUN SERPL-MCNC: 12 MG/DL (ref 7–20)
CALCIUM SERPL-MCNC: 9 MG/DL (ref 8.3–10.6)
CHLORIDE SERPL-SCNC: 104 MMOL/L (ref 99–110)
CO2 SERPL-SCNC: 25 MMOL/L (ref 21–32)
CREAT SERPL-MCNC: 0.7 MG/DL (ref 0.6–1.1)
DEPRECATED RDW RBC AUTO: 14.6 % (ref 12.4–15.4)
EOSINOPHIL # BLD: 0.1 K/UL (ref 0–0.6)
EOSINOPHIL NFR BLD: 2 %
GFR SERPLBLD CREATININE-BSD FMLA CKD-EPI: >90 ML/MIN/{1.73_M2}
GLUCOSE SERPL-MCNC: 77 MG/DL (ref 70–99)
HCT VFR BLD AUTO: 36.8 % (ref 36–48)
HGB BLD-MCNC: 11.9 G/DL (ref 12–16)
LYMPHOCYTES # BLD: 1.6 K/UL (ref 1–5.1)
LYMPHOCYTES NFR BLD: 22.2 %
MCH RBC QN AUTO: 27.3 PG (ref 26–34)
MCHC RBC AUTO-ENTMCNC: 32.3 G/DL (ref 31–36)
MCV RBC AUTO: 84.6 FL (ref 80–100)
MONOCYTES # BLD: 0.4 K/UL (ref 0–1.3)
MONOCYTES NFR BLD: 5.9 %
NEUTROPHILS # BLD: 5.1 K/UL (ref 1.7–7.7)
NEUTROPHILS NFR BLD: 69 %
PLATELET # BLD AUTO: 241 K/UL (ref 135–450)
PMV BLD AUTO: 10.4 FL (ref 5–10.5)
POTASSIUM SERPL-SCNC: 4.4 MMOL/L (ref 3.5–5.1)
PROT SERPL-MCNC: 6.5 G/DL (ref 6.4–8.2)
RBC # BLD AUTO: 4.34 M/UL (ref 4–5.2)
SODIUM SERPL-SCNC: 139 MMOL/L (ref 136–145)
TSH SERPL DL<=0.005 MIU/L-ACNC: 1.08 UIU/ML (ref 0.27–4.2)
WBC # BLD AUTO: 7.4 K/UL (ref 4–11)

## 2025-04-21 ENCOUNTER — RESULTS FOLLOW-UP (OUTPATIENT)
Age: 35
End: 2025-04-21

## 2025-05-06 ENCOUNTER — TELEMEDICINE (OUTPATIENT)
Age: 35
End: 2025-05-06
Payer: MEDICAID

## 2025-05-06 PROCEDURE — G2211 COMPLEX E/M VISIT ADD ON: HCPCS

## 2025-05-06 PROCEDURE — G8417 CALC BMI ABV UP PARAM F/U: HCPCS

## 2025-05-06 PROCEDURE — G8428 CUR MEDS NOT DOCUMENT: HCPCS

## 2025-05-06 PROCEDURE — 99214 OFFICE O/P EST MOD 30 MIN: CPT

## 2025-05-06 PROCEDURE — 1036F TOBACCO NON-USER: CPT

## 2025-05-09 NOTE — PROGRESS NOTES
[facility]  [address]  [facility] [address]  Dept: [phone]  Dept Fax: [phone]  Loc: [phone]      Visit type: Established    Encounter Start Time: 3:01 PM EDT  Encounter End Time: 3:09 PM EDT     Reason for Visit: Depression      Assessment and Plan       Assessment & Plan  Postpartum Depression  Uncontrolled  Symptoms are likely side effects of Lexapro. Reports memory issues and lack of concentration since starting Lexapro on 04/19/2025.  TREATMENT:  - Discontinuing Lexapro  - Starting Zoloft 50 mg daily  FUTURE PLANS:  - If confusion persists with Zoloft, a medication-free period of 1 to 2 weeks may be considered to ensure the confusion is not due to other causes.  - If Zoloft is ineffective, Celexa may be considered as an alternative.      Patient was involved and agreeable in shared decision making.  Information about different treatment options including side effects discussed with patient.  Any information requested was supplied.    Return in about 4 weeks (around 6/3/2025) for Mood.      Subjective       History of Present Illness  The patient is a 35-year-old female who is here for follow-up for mood.    She reports an improvement in her overall mood. However, she has been experiencing memory issues since the initiation of Lexapro on 04/19/2025. These memory lapses have not shown any signs of improvement. She has not trialed any other SSRIs prior to this. She confirms that she has taken her dose of Lexapro today. She does not endorse any suicidal or homicidal ideations.          Past medical history reviewed  Medication review  Allergies reviewed    Objective       There were no vitals taken for this visit.    Physical Exam  Psychiatric:         Attention and Perception: Attention and perception normal.         Mood and Affect: Mood and affect normal.         Behavior: Behavior normal. Behavior is cooperative.         Thought Content: Thought 
Spoke with pt/Guardian. Pt. States she would reach back out to us at the office at a later time to schedule appt.     No future appointments.    Joyce Blair MA  3:38 PM  05/09/25  
home w/ parent

## 2025-06-12 ENCOUNTER — PATIENT MESSAGE (OUTPATIENT)
Age: 35
End: 2025-06-12

## 2025-06-12 ENCOUNTER — OFFICE VISIT (OUTPATIENT)
Age: 35
End: 2025-06-12
Payer: MEDICAID

## 2025-06-12 VITALS
DIASTOLIC BLOOD PRESSURE: 78 MMHG | OXYGEN SATURATION: 98 % | SYSTOLIC BLOOD PRESSURE: 118 MMHG | HEART RATE: 80 BPM | BODY MASS INDEX: 26.16 KG/M2 | RESPIRATION RATE: 20 BRPM | WEIGHT: 157.2 LBS

## 2025-06-12 DIAGNOSIS — D64.9 NORMOCYTIC ANEMIA: Primary | ICD-10-CM

## 2025-06-12 DIAGNOSIS — N89.8 VAGINAL DISCHARGE: ICD-10-CM

## 2025-06-12 PROBLEM — T80.211A BLOODSTREAM INFECTION DUE TO CENTRAL VENOUS CATHETER: Status: RESOLVED | Noted: 2025-01-08 | Resolved: 2025-06-12

## 2025-06-12 PROBLEM — O21.0 HYPEREMESIS GRAVIDARUM: Status: RESOLVED | Noted: 2025-02-25 | Resolved: 2025-06-12

## 2025-06-12 PROBLEM — A49.8 INFECTION DUE TO ACINETOBACTER BAUMANNII: Status: RESOLVED | Noted: 2025-01-08 | Resolved: 2025-06-12

## 2025-06-12 PROBLEM — O36.8390 ABNORMAL FETAL HEART RATE AFFECTING PREGNANCY: Status: RESOLVED | Noted: 2025-01-09 | Resolved: 2025-06-12

## 2025-06-12 PROBLEM — Z79.2 RECEIVING INTRAVENOUS ANTIBIOTIC TREATMENT AS OUTPATIENT: Status: RESOLVED | Noted: 2025-02-03 | Resolved: 2025-06-12

## 2025-06-12 PROBLEM — Z3A.31 31 WEEKS GESTATION OF PREGNANCY: Status: RESOLVED | Noted: 2025-01-06 | Resolved: 2025-06-12

## 2025-06-12 PROBLEM — A41.9 SEPSIS (HCC): Status: RESOLVED | Noted: 2025-01-06 | Resolved: 2025-06-12

## 2025-06-12 PROBLEM — T82.7XXD: Status: RESOLVED | Noted: 2025-01-10 | Resolved: 2025-06-12

## 2025-06-12 PROBLEM — R00.0 TACHYCARDIA: Status: RESOLVED | Noted: 2025-01-06 | Resolved: 2025-06-12

## 2025-06-12 PROBLEM — A41.9: Status: RESOLVED | Noted: 2025-01-10 | Resolved: 2025-06-12

## 2025-06-12 PROCEDURE — G2211 COMPLEX E/M VISIT ADD ON: HCPCS

## 2025-06-12 PROCEDURE — G8427 DOCREV CUR MEDS BY ELIG CLIN: HCPCS

## 2025-06-12 PROCEDURE — G8417 CALC BMI ABV UP PARAM F/U: HCPCS

## 2025-06-12 PROCEDURE — 99214 OFFICE O/P EST MOD 30 MIN: CPT

## 2025-06-12 PROCEDURE — 1036F TOBACCO NON-USER: CPT

## 2025-06-12 RX ORDER — FLUCONAZOLE 150 MG/1
150 TABLET ORAL ONCE
Qty: 1 TABLET | Refills: 0 | Status: SHIPPED | OUTPATIENT
Start: 2025-06-12 | End: 2025-06-12

## 2025-06-12 NOTE — PROGRESS NOTES
[facility]  [address]  [facility] [address]  Dept: [phone]  Dept Fax: [phone]  Loc: [phone]      Visit type: Established patient    Encounter Start Time: 11:04 AM EDT  Encounter End Time: 11:09 AM EDT     100% of the time was spent with the patient today, discussing their symptoms, conducting an examination, reviewing the patient's diagnostic test results, and counseling    Reason for Visit: Other (Medication pt. Has vaginal itching and discharge. )      Assessment and Plan         Assessment & Plan  Recurrent yeast infections  Uncontrolled  Experiences recurrent yeast infections, particularly when she stops consuming yogurt. Reports that Diflucan usually works within a day or two.  TREATMENT:   - Prescription for Diflucan will be sent to the pharmacy.  - Advised to inform the clinic if the medication does not work.    Postpartum depression  Stable  No current symptoms or ideations of self-harm or harm to others. Reports feeling better on Zoloft compared to previous medications.  TREATMENT:   - Continue taking Zoloft 50 mg in the morning and place it next to her toothbrush to avoid forgetting.  FUTURE PLANS:   - Current treatment plan will be maintained for an additional 4 weeks, after which the effectiveness of the medication will be evaluated. Dosage adjustments or discontinuation may be considered based on symptomatology.    Normocytic anemia  Improving  History of normocytic anemia, likely due to inconsistent iron intake during a stressful period. Currently taking iron supplements.  MONITORING STUDIES:   - Iron levels and CBC will be reassessed at the next visit to determine if any adjustments are needed.    Follow-up  The patient will follow up in 4 weeks.    No orders of the defined types were placed in this encounter.    Orders Placed This Encounter   Medications    sertraline (ZOLOFT) 50 MG tablet     Sig: Take 1 tablet by mouth daily     Dispense:

## 2025-07-18 ENCOUNTER — TELEPHONE (OUTPATIENT)
Dept: OBGYN | Age: 35
End: 2025-07-18

## 2025-07-18 RX ORDER — DICLOXACILLIN SODIUM 500 MG/1
500 CAPSULE ORAL 3 TIMES DAILY
Qty: 30 CAPSULE | Refills: 0 | Status: SHIPPED | OUTPATIENT
Start: 2025-07-18 | End: 2025-07-28

## 2025-07-18 NOTE — TELEPHONE ENCOUNTER
Pt c/o mastitis x 2 days pt states is having a fever on and off. Please advise. Pharmacy [facility]. Please advise?

## 2025-07-22 ENCOUNTER — OFFICE VISIT (OUTPATIENT)
Dept: OBGYN | Age: 35
End: 2025-07-22
Payer: MEDICAID

## 2025-07-22 VITALS
BODY MASS INDEX: 36.39 KG/M2 | SYSTOLIC BLOOD PRESSURE: 115 MMHG | WEIGHT: 218.4 LBS | DIASTOLIC BLOOD PRESSURE: 75 MMHG | HEART RATE: 78 BPM | HEIGHT: 65 IN

## 2025-07-22 DIAGNOSIS — O91.23 MASTITIS ASSOCIATED WITH LACTATION: Primary | ICD-10-CM

## 2025-07-22 PROCEDURE — G8417 CALC BMI ABV UP PARAM F/U: HCPCS

## 2025-07-22 PROCEDURE — 1036F TOBACCO NON-USER: CPT

## 2025-07-22 PROCEDURE — G8427 DOCREV CUR MEDS BY ELIG CLIN: HCPCS

## 2025-07-22 PROCEDURE — 99212 OFFICE O/P EST SF 10 MIN: CPT

## 2025-07-22 RX ORDER — TRIAZOLAM 0.25 MG
0.25 TABLET ORAL
COMMUNITY
Start: 2025-07-14

## 2025-07-22 ASSESSMENT — ENCOUNTER SYMPTOMS
RESPIRATORY NEGATIVE: 1
VOMITING: 0
NAUSEA: 0
DIARRHEA: 0
GASTROINTESTINAL NEGATIVE: 1
CONSTIPATION: 0
CHEST TIGHTNESS: 0
ABDOMINAL PAIN: 0
SHORTNESS OF BREATH: 0

## 2025-07-22 NOTE — PROGRESS NOTES
25    Marissa Flores  1990    Chief Complaint   Patient presents with    Breast Pain     Pt presents today c/o mastitis sx x 6 days; pt states she began antibiotics a few days ago and sx have improved as of today.         Marissa Flores is a 35 y.o. female who presents today for evaluation of mastitis in L breast. Patient called with symptoms on Friday, and antibiotic was sent. Patient states she is feeling much better, denies any pain, redness, swelling, fevers or chills.     Past Medical History:   Diagnosis Date    Abnormal fetal heart rate affecting pregnancy 2025    Anxiety     Atypical glandular cells on cervical Pap smear     Bloodstream infection due to central venous catheter 2025    Constipation     Constipation     GERD (gastroesophageal reflux disease)     H/O gastric sleeve     2023    History of blood transfusion     Hyperemesis     Hyperemesis gravidarum 2025    Infection due to acinetobacter baumannii 2025    Migraine     Postpartum depression     Sepsis (HCC)     Sepsis associated with internal vascular access, subsequent encounter ([facility]) 01/10/2025    TIA (transient ischemic attack)        Past Surgical History:   Procedure Laterality Date    CERVICAL BIOPSY  W/ LOOP ELECTRODE EXCISION       SECTION N/A 2025     SECTION performed by Minh Banerjee MD at [facility]    CHOLECYSTECTOMY      STOMACH SURGERY  2023    Gastric sleeve    TONSILLECTOMY         Social History     Tobacco Use    Smoking status: Never    Smokeless tobacco: Never   Vaping Use    Vaping status: Never Used   Substance Use Topics    Alcohol use: Not Currently    Drug use: Not Currently       Family History   Problem Relation Age of Onset    Cancer Father         testicular    Diabetes Maternal Grandmother     Diabetes Maternal Grandfather     Pancreatic Cancer Maternal Grandfather        Current Outpatient Medications   Medication Sig Dispense Refill    triazolam

## 2025-07-29 ENCOUNTER — OFFICE VISIT (OUTPATIENT)
Dept: FAMILY MEDICINE CLINIC | Age: 35
End: 2025-07-29
Payer: MEDICAID

## 2025-07-29 ENCOUNTER — HOSPITAL ENCOUNTER (OUTPATIENT)
Dept: GENERAL RADIOLOGY | Age: 35
Discharge: HOME | End: 2025-07-29
Payer: MEDICAID

## 2025-07-29 ENCOUNTER — OFFICE VISIT (OUTPATIENT)
Dept: ORTHOPEDIC SURGERY | Age: 35
End: 2025-07-29
Payer: MEDICAID

## 2025-07-29 VITALS
RESPIRATION RATE: 16 BRPM | WEIGHT: 219 LBS | HEIGHT: 65 IN | OXYGEN SATURATION: 97 % | HEART RATE: 76 BPM | BODY MASS INDEX: 36.49 KG/M2

## 2025-07-29 VITALS
TEMPERATURE: 97.5 F | WEIGHT: 219.6 LBS | DIASTOLIC BLOOD PRESSURE: 78 MMHG | BODY MASS INDEX: 36.54 KG/M2 | SYSTOLIC BLOOD PRESSURE: 122 MMHG | HEART RATE: 95 BPM | OXYGEN SATURATION: 97 %

## 2025-07-29 DIAGNOSIS — S89.92XA ACUTE INJURY OF LEFT ANTERIOR CRUCIATE LIGAMENT, INITIAL ENCOUNTER: ICD-10-CM

## 2025-07-29 DIAGNOSIS — M25.562 LEFT KNEE PAIN, UNSPECIFIED CHRONICITY: ICD-10-CM

## 2025-07-29 DIAGNOSIS — M25.562 LEFT KNEE PAIN, UNSPECIFIED CHRONICITY: Primary | ICD-10-CM

## 2025-07-29 DIAGNOSIS — M25.562 ACUTE PAIN OF LEFT KNEE: Primary | ICD-10-CM

## 2025-07-29 DIAGNOSIS — S89.92XA LEFT KNEE INJURY, INITIAL ENCOUNTER: Primary | ICD-10-CM

## 2025-07-29 DIAGNOSIS — M25.462 KNEE EFFUSION, LEFT: ICD-10-CM

## 2025-07-29 PROCEDURE — 99213 OFFICE O/P EST LOW 20 MIN: CPT | Performed by: NURSE PRACTITIONER

## 2025-07-29 PROCEDURE — 1036F TOBACCO NON-USER: CPT | Performed by: PHYSICIAN ASSISTANT

## 2025-07-29 PROCEDURE — G8427 DOCREV CUR MEDS BY ELIG CLIN: HCPCS | Performed by: PHYSICIAN ASSISTANT

## 2025-07-29 PROCEDURE — G8417 CALC BMI ABV UP PARAM F/U: HCPCS | Performed by: NURSE PRACTITIONER

## 2025-07-29 PROCEDURE — G8417 CALC BMI ABV UP PARAM F/U: HCPCS | Performed by: PHYSICIAN ASSISTANT

## 2025-07-29 PROCEDURE — 1036F TOBACCO NON-USER: CPT | Performed by: NURSE PRACTITIONER

## 2025-07-29 PROCEDURE — 73564 X-RAY EXAM KNEE 4 OR MORE: CPT

## 2025-07-29 PROCEDURE — G8427 DOCREV CUR MEDS BY ELIG CLIN: HCPCS | Performed by: NURSE PRACTITIONER

## 2025-07-29 PROCEDURE — 99203 OFFICE O/P NEW LOW 30 MIN: CPT | Performed by: PHYSICIAN ASSISTANT

## 2025-07-29 RX ORDER — HYDROCODONE BITARTRATE AND ACETAMINOPHEN 5; 325 MG/1; MG/1
1 TABLET ORAL EVERY 6 HOURS PRN
Qty: 28 TABLET | Refills: 0 | Status: SHIPPED | OUTPATIENT
Start: 2025-07-29 | End: 2025-08-05

## 2025-07-29 NOTE — PATIENT INSTRUCTIONS
[facility] 140 this afternoon with Jonathan Shepherd.     Arrive 130 to complete any needed paperwork.   Thanks.     
3

## 2025-07-29 NOTE — PATIENT INSTRUCTIONS
Central scheduling [phone] will be calling you to schedule your MRI.  If you do not hear from them with in a week give them a call.   Please call your office to schedule a follow up once MRI is scheduled.     Solaraze Counseling:  I discussed with the patient the risks of Solaraze including but not limited to erythema, scaling, itching, weeping, crusting, and pain.

## 2025-07-29 NOTE — PROGRESS NOTES
Patient seen in office today for left knee pain   DOI:07/21/2025  Patient reports 6/10 pain. While sitting any weight on it is 10/10 pain   RICE and medication are not effective to alleviate pain and reduce swelling.   Pain worsened by: Patient reports painful ROM & weight bearing.

## 2025-07-29 NOTE — PROGRESS NOTES
7/29/2025    Marissa Flores    Chief Complaint   Patient presents with    Knee Injury     Pt states was playing volleyball on last Monday- and landed on it wrong- sore, and can't put pressure on it.       HPI  History of Present Illness  The patient presents for evaluation of left knee pain.    Left Knee Pain  They sustained an injury to their left knee while playing volleyball on 07/21/2025. Despite the initial discomfort, they continued to play as the pain was not severe. However, over the past week, they have experienced increasing soreness during the day and intense pain at night, making it difficult to find a comfortable position. A few days ago, the pain escalated to the point where they were unable to bear weight on the knee upon waking up this morning. They report no numbness or tingling in the leg and are uncertain about any swelling. This is their first injury to the knee, and they have not undergone any previous surgeries on it. They describe the pain as being on both sides of the knee, extending under the kneecap and radiating up behind their leg. They recall that their knee buckled and twisted at the time of the injury, causing them to limp temporarily, but they do not believe it is still buckling. They have been managing the pain with ibuprofen, but it has not been effective. They also tried applying ice to the area last night.  - Onset: Injury sustained on 07/21/2025 while playing volleyball.  - Location: Both sides of the knee, extending under the kneecap and radiating up behind the leg.  - Duration: Increasing soreness over the past week; intense pain at night.  - Character: Soreness during the day, intense pain at night, difficulty finding a comfortable position.  - Alleviating/Aggravating Factors: Ibuprofen and ice applied last night; ibuprofen has not been effective.  - Radiation: Pain radiating up behind the leg.  - Timing: Pain escalated a few days ago; unable to bear weight on the knee upon

## 2025-07-29 NOTE — PROGRESS NOTES
ORTHOPEDIC SURGERY OFFICE NOTE  CHIEF COMPLAINT:  Chief Complaint   Patient presents with    Knee Pain     Left knee pain      HISTORY OF PRESENT ILLNESS:  Marissa Flores is a 35 y.o. female Patient seen in office today for left knee pain   DOI:07/21/2025  Patient reports 6/10 pain. While sitting any weight on it is 10/10 pain   RICE and medication are not effective to alleviate pain and reduce swelling.   Pain worsened by: Patient reports painful ROM & weight bearing.     (HPI) 35-year-old female presenting to office with concern of left knee pain and associated knee effusion.  States that she injured her knee 8 days ago while playing volleyball.  States that she went for a ball and twisted her left knee and had immediate pain into the kneecap and posterior aspect of the knee.  Over the last week she feels like her symptoms have progressed and became more painful and swollen.  Has generalized swelling throughout the knee joint.  Admits to some instability, pain worsened with flexion.  Has been taking Tylenol, ibuprofen, has been on crutches, weightbearing as tolerated with continual symptoms.  Feels like over the last 2 days she has had worsening swelling and associated pain within the knee.  Continued instability.  Concerned about a structural injury within the knee.  No history of orthopedic issues and/or injuries in her past.  Has no other joint pains or other signs of injury.    PAST MEDICAL HISTORY:  Past Medical History:   Diagnosis Date    Abnormal fetal heart rate affecting pregnancy 01/09/2025    Anxiety     Atypical glandular cells on cervical Pap smear     Bloodstream infection due to central venous catheter 01/08/2025    Constipation     Constipation     GERD (gastroesophageal reflux disease)     H/O gastric sleeve     july of 2023    History of blood transfusion     Hyperemesis     Hyperemesis gravidarum 02/25/2025    Infection due to acinetobacter baumannii 01/08/2025    Migraine     Postpartum

## 2025-07-30 ENCOUNTER — HOSPITAL ENCOUNTER (OUTPATIENT)
Dept: MRI IMAGING | Age: 35
Discharge: HOME | End: 2025-07-30
Payer: MEDICAID

## 2025-07-30 ENCOUNTER — TELEPHONE (OUTPATIENT)
Dept: ORTHOPEDIC SURGERY | Age: 35
End: 2025-07-30

## 2025-07-30 DIAGNOSIS — S89.92XA ACUTE INJURY OF LEFT ANTERIOR CRUCIATE LIGAMENT, INITIAL ENCOUNTER: ICD-10-CM

## 2025-07-30 DIAGNOSIS — M25.40 EFFUSION OF JOINT, UNSPECIFIED LOCATION: Primary | ICD-10-CM

## 2025-07-30 DIAGNOSIS — S89.92XA LEFT KNEE INJURY, INITIAL ENCOUNTER: ICD-10-CM

## 2025-07-30 DIAGNOSIS — M25.462 KNEE EFFUSION, LEFT: ICD-10-CM

## 2025-07-30 PROCEDURE — 73721 MRI JNT OF LWR EXTRE W/O DYE: CPT

## 2025-07-30 RX ORDER — OXYCODONE AND ACETAMINOPHEN 5; 325 MG/1; MG/1
1 TABLET ORAL EVERY 6 HOURS PRN
Qty: 10 TABLET | Refills: 0 | Status: SHIPPED | OUTPATIENT
Start: 2025-07-30 | End: 2025-08-06

## 2025-07-30 NOTE — TELEPHONE ENCOUNTER
Pt called stated that the pain medicine is not working it only gives her relief for about an hour and then the pain is miserable. She is asking if you could send something stronger to [facility] [address]

## 2025-08-06 ENCOUNTER — OFFICE VISIT (OUTPATIENT)
Dept: ORTHOPEDIC SURGERY | Age: 35
End: 2025-08-06
Payer: MEDICAID

## 2025-08-06 VITALS
HEIGHT: 65 IN | RESPIRATION RATE: 16 BRPM | HEART RATE: 85 BPM | WEIGHT: 218.6 LBS | OXYGEN SATURATION: 99 % | BODY MASS INDEX: 36.42 KG/M2

## 2025-08-06 DIAGNOSIS — S89.92XA: Primary | ICD-10-CM

## 2025-08-06 PROCEDURE — 99213 OFFICE O/P EST LOW 20 MIN: CPT | Performed by: PHYSICIAN ASSISTANT

## 2025-08-06 PROCEDURE — G8417 CALC BMI ABV UP PARAM F/U: HCPCS | Performed by: PHYSICIAN ASSISTANT

## 2025-08-06 PROCEDURE — 1036F TOBACCO NON-USER: CPT | Performed by: PHYSICIAN ASSISTANT

## 2025-08-06 PROCEDURE — G8427 DOCREV CUR MEDS BY ELIG CLIN: HCPCS | Performed by: PHYSICIAN ASSISTANT

## (undated) DEVICE — SPONGE GZ W4XL8IN COT WVN 12 PLY

## (undated) DEVICE — PREMIUM WET SKIN PREP TRAY: Brand: MEDLINE INDUSTRIES, INC.

## (undated) DEVICE — SUTURE MONOCRYL SZ 3-0 L27IN ABSRB UD L60MM KS STR REV CUT Y523H

## (undated) DEVICE — TOTAL TRAY, DB, 100% SILI FOLEY, 16FR 10: Brand: MEDLINE

## (undated) DEVICE — BABY BLANKET KIT: Brand: MEDLINE INDUSTRIES, INC.

## (undated) DEVICE — GOWN,SIRUS,FABRNF,RAGLAN,XL,ST,28/CS: Brand: MEDLINE

## (undated) DEVICE — SHEET,DRAPE,53X77,STERILE: Brand: MEDLINE

## (undated) DEVICE — GARMENT,MEDLINE,DVT,INT,CALF,MED, GEN2: Brand: MEDLINE

## (undated) DEVICE — 3M™ STERI-STRIP™ COMPOUND BENZOIN TINCTURE 40 BAGS/CARTON 4 CARTONS/CASE C1544: Brand: 3M™ STERI-STRIP™

## (undated) DEVICE — 4-PORT MANIFOLD: Brand: NEPTUNE 2

## (undated) DEVICE — GOWN,ECLIPSE,POLYRNF,BRTHSLV,L,30/CS: Brand: MEDLINE

## (undated) DEVICE — AGENT HEMSTAT W4XL4IN OXIDIZED REGENERATED CELOS ABSRB SFT

## (undated) DEVICE — GLOVE ORANGE PI 8   MSG9080

## (undated) DEVICE — Device

## (undated) DEVICE — CLEANER,CAUTERY TIP,2X2",STERILE: Brand: MEDLINE

## (undated) DEVICE — STRIP,CLOSURE,WOUND,MEDI-STRIP,1/2X4: Brand: MEDLINE

## (undated) DEVICE — APPLICATOR MEDICATED 26 CC SOLUTION HI LT ORNG CHLORAPREP

## (undated) DEVICE — STRIP SKIN CLSR W1XL5IN NYLON REINF CURAD STERIL

## (undated) DEVICE — MATTRESS MAXI AIR TRNSF SGL PT USE DCS 37 45 48 52 75

## (undated) DEVICE — TOWEL,OR,DSP,ST,BLUE,STD,6/PK,12PK/CS: Brand: MEDLINE

## (undated) DEVICE — SOLUTION IRRIG 1000ML 0.9% SOD CHL USP POUR PLAS BTL

## (undated) DEVICE — BLOOD TRANSFER DEVICE: Brand: MEDLINE

## (undated) DEVICE — SUTURE VICRYL SZ 0 L36IN ABSRB VLT L36MM CT-1 1/2 CIR J346H

## (undated) DEVICE — SOLUTION IRRIG 1000ML STRL H2O USP PLAS POUR BTL

## (undated) DEVICE — ELECTRODE ES AD CRDLSS PT RET REM POLYHESIVE

## (undated) DEVICE — SUTURE VICRYL ABSRB BRAID COAT UD CTX 3-0 36IN  J980H

## (undated) DEVICE — DRESSING TRNSPAR W8XL12IN FLM SURESITE 123